# Patient Record
Sex: MALE | Race: WHITE | NOT HISPANIC OR LATINO | Employment: OTHER | URBAN - METROPOLITAN AREA
[De-identification: names, ages, dates, MRNs, and addresses within clinical notes are randomized per-mention and may not be internally consistent; named-entity substitution may affect disease eponyms.]

---

## 2018-07-16 ENCOUNTER — HOSPITAL ENCOUNTER (OUTPATIENT)
Dept: ULTRASOUND IMAGING | Facility: CLINIC | Age: 58
Discharge: HOME | End: 2018-07-16
Attending: FAMILY MEDICINE | Admitting: FAMILY MEDICINE
Payer: MEDICARE

## 2018-07-16 DIAGNOSIS — Z90.5 HX OF PARTIAL NEPHRECTOMY: ICD-10-CM

## 2018-07-16 PROCEDURE — 76770 US EXAM ABDO BACK WALL COMP: CPT

## 2018-10-15 ENCOUNTER — TRANSFERRED RECORDS (OUTPATIENT)
Dept: HEALTH INFORMATION MANAGEMENT | Facility: CLINIC | Age: 58
End: 2018-10-15

## 2018-10-18 ENCOUNTER — TRANSFERRED RECORDS (OUTPATIENT)
Dept: HEALTH INFORMATION MANAGEMENT | Facility: CLINIC | Age: 58
End: 2018-10-18

## 2018-10-30 ENCOUNTER — TRANSFERRED RECORDS (OUTPATIENT)
Dept: HEALTH INFORMATION MANAGEMENT | Facility: CLINIC | Age: 58
End: 2018-10-30

## 2018-10-30 ENCOUNTER — MEDICAL CORRESPONDENCE (OUTPATIENT)
Dept: HEALTH INFORMATION MANAGEMENT | Facility: CLINIC | Age: 58
End: 2018-10-30

## 2018-12-05 ENCOUNTER — PRE VISIT (OUTPATIENT)
Dept: SLEEP MEDICINE | Facility: CLINIC | Age: 58
End: 2018-12-05

## 2018-12-05 NOTE — TELEPHONE ENCOUNTER
"  1.  Reason for the visit:  Cosult to discuss low oxygen levels at night  2.  Referring provider and clinic name:  [facility]  3.  Previous Sleep Doctor or Pulmonlogist (clinic name)?  None  4.  Records, Procedures, Imaging, and Labs (see below)  No records to obtain        All NOTES from previous office visits that pertain to why they are being seen in the Sleep Center    Previous Sleep Studies, Chest CT, Echos and reports that pertain to why they are seeing Sleep Center    All Sleep records that have been done in the last 2 years that pertain to why they are seeing Sleep Center            Are they being seen for continuation of care for Cpap/Bipap/Avap/Trilogy/Dental Device? none    If yes to above Who and Where was Device issued/currently getting supplies from? na    Are you currently on \"Supplemental Oxygen\" during the day or night?   na                                                                                                                                                      Please remind pt to bring Cpap machine and ask to arrive 15 minutes early to appointment due traffic and congestion                                                 5. Pt Sleep Center Packet received Message left asking pt to arrive 30 minutes early to appointment if no packet received.        Yes: \"please make sure that you bring this to your appointment completed, either the doctor will not see you until this completed or you may be asked to reschedule your appointment.\"     No: mail or email to the pt and explain, \"please make sure that you bring this to your appointment completed, either the doctor will not see you until this completed or you may be asked to reschedule your appointment.\"     ~If pt coming early to fill packet out, ask that they come 30 minutes prior to their appointment~     6. Has the pt's medication list been updated and preferred pharmacy added?     7. Has the allergy list been reviewed?    \"Thank you for " "choosing [facility] and we look forward to seeing you at your upcoming appointment\"     "

## 2018-12-06 ENCOUNTER — OFFICE VISIT (OUTPATIENT)
Dept: SLEEP MEDICINE | Facility: CLINIC | Age: 58
End: 2018-12-06
Payer: MEDICARE

## 2018-12-06 VITALS
SYSTOLIC BLOOD PRESSURE: 106 MMHG | RESPIRATION RATE: 16 BRPM | BODY MASS INDEX: 21.47 KG/M2 | HEIGHT: 70 IN | DIASTOLIC BLOOD PRESSURE: 70 MMHG | HEART RATE: 81 BPM | WEIGHT: 150 LBS | OXYGEN SATURATION: 100 %

## 2018-12-06 DIAGNOSIS — R09.02 HYPOXEMIA: Primary | ICD-10-CM

## 2018-12-06 PROCEDURE — 99204 OFFICE O/P NEW MOD 45 MIN: CPT | Performed by: INTERNAL MEDICINE

## 2018-12-06 NOTE — NURSING NOTE
"    Chief Complaint   Patient presents with     Consult     Follow up MENDEL       Initial /70  Pulse 81  Resp 16  Ht 1.778 m (5' 10\")  Wt 68 kg (150 lb)  SpO2 100%  BMI 21.52 kg/m2 Estimated body mass index is 21.52 kg/(m^2) as calculated from the following:    Height as of this encounter: 1.778 m (5' 10\").    Weight as of this encounter: 68 kg (150 lb).    Medication Reconciliation: complete    Neck circumference: 16 inches / 40 centimeters.    DME:     Anni Castillo [facility] ~[place]       "
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
## 2018-12-06 NOTE — MR AVS SNAPSHOT
After Visit Summary   12/6/2018    Alex Rodríguez    MRN: [id number]           Patient Information     Date Of Birth          1960        Visit Information        Provider Department      12/6/2018 2:00 PM Marino Martinez MD [facility]        Today's Diagnoses     Hypoxemia    -  1      Care Instructions      Your BMI is Body mass index is 21.52 kg/(m^2).  Weight management is a personal decision.  If you are interested in exploring weight loss strategies, the following discussion covers the approaches that may be successful. Body mass index (BMI) is one way to tell whether you are at a healthy weight, overweight, or obese. It measures your weight in relation to your height.  A BMI of 18.5 to 24.9 is in the healthy range. A person with a BMI of 25 to 29.9 is considered overweight, and someone with a BMI of 30 or greater is considered obese. More than two-thirds of American adults are considered overweight or obese.  Being overweight or obese increases the risk for further weight gain. Excess weight may lead to heart disease and diabetes.  Creating and following plans for healthy eating and physical activity may help you improve your health.  Weight control is part of healthy lifestyle and includes exercise, emotional health, and healthy eating habits. Careful eating habits lifelong are the mainstay of weight control. Though there are significant health benefits from weight loss, long-term weight loss with diet alone may be very difficult to achieve- studies show long-term success with dietary management in less than 10% of people. Attaining a healthy weight may be especially difficult to achieve in those with severe obesity. In some cases, medications, devices and surgical management might be considered.  What can you do?  If you are overweight or obese and are interested in methods for weight loss, you should discuss this with your provider.     Consider reducing daily  calorie intake by 500 calories.     Keep a food journal.     Avoiding skipping meals, consider cutting portions instead.    Diet combined with exercise helps maintain muscle while optimizing fat loss. Strength training is particularly important for building and maintaining muscle mass. Exercise helps reduce stress, increase energy, and improves fitness. Increasing exercise without diet control, however, may not burn enough calories to loose weight.       Start walking three days a week 10-20 minutes at a time    Work towards walking thirty minutes five days a week     Eventually, increase the speed of your walking for 1-2 minutes at time    In addition, we recommend that you review healthy lifestyles and methods for weight loss available through the National Institutes of Health patient information sites:  http://win.niddk.nih.gov/publications/index.htm    And look into health and wellness programs that may be available through your health insurance provider, employer, local community center, or deric club.                  Follow-ups after your visit        Future tests that were ordered for you today     Open Future Orders        Priority Expected Expires Ordered    Overnight oximetry study Routine  6/4/2019 12/6/2018            Who to contact     If you have questions or need follow up information about today's clinic visit or your schedule please contact [facility] directly at [phone].  Normal or non-critical lab and imaging results will be communicated to you by MyChart, letter or phone within 4 business days after the clinic has received the results. If you do not hear from us within 7 days, please contact the clinic through MyChart or phone. If you have a critical or abnormal lab result, we will notify you by phone as soon as possible.  Submit refill requests through [employer] or call your pharmacy and they will forward the refill request to us. Please allow 3 business days for your  "refill to be completed.          Additional Information About Your Visit        Care EveryWhere ID     This is your Care EveryWhere ID. This could be used by other organizations to access your [place] medical records  [id number]        Your Vitals Were     Pulse Respirations Height Pulse Oximetry BMI (Body Mass Index)       81 16 1.778 m (5' 10\") 100% 21.52 kg/m2        Blood Pressure from Last 3 Encounters:   12/06/18 106/70   11/04/13 (!) 80/59   02/20/13 103/65    Weight from Last 3 Encounters:   12/06/18 68 kg (150 lb)   10/08/12 70.3 kg (155 lb)   09/20/12 70.3 kg (155 lb)               Primary Care Provider Office Phone # Fax #    Aileen Paulino [phone]       [place] [address]        Equal Access to Services     NESHA MCGEE : Hadii aad ku hadasho Soomaali, waaxda luqadaha, qaybta kaalmada adeegyada, waxay idiin hayaan rene kharaalexis jangn ah. So [facility] [phone].    ATENCIÓN: Si habla español, tiene a william disposición servicios gratuitos de asistencia lingüística. Lilliam al [phone].    We comply with applicable federal civil rights laws and [place] laws. We do not discriminate on the basis of race, color, national origin, age, disability, sex, sexual orientation, or gender identity.            Thank you!     Thank you for choosing [facility]  for your care. Our goal is always to provide you with excellent care. Hearing back from our patients is one way we can continue to improve our services. Please take a few minutes to complete the written survey that you may receive in the mail after your visit with us. Thank you!             Your Updated Medication List - Protect others around you: Learn how to safely use, store and throw away your medicines at www.disposemymeds.org.          This list is accurate as of 12/6/18  3:18 PM.  Always use your most recent med list.                   Brand Name Dispense Instructions " "for use Diagnosis    acidophilus 10 MG Caps    BACID     Take 1 capsule by mouth 2 times daily.        bisacodyl 5 MG EC tablet    DULCOLAX    2 tablet    Take 1 tablet (5 mg) by mouth See Admin Instructions Refer to \"Getting Ready for a Colonoscopy\" patient handout    Special screening for malignant neoplasms, colon       COMPRESSION STOCKINGS     3 each    1 each daily Compression stockings, knee high to be worn during days, ok off at night. Medium 20-30 mmHg compression. 3 pair. 6 refills.    Edema       fluticasone 50 MCG/ACT nasal spray    FLONASE     Spray 2 sprays into both nostrils daily.        MACRODANTIN PO      Take 100 mg in the morning and 200 mg in the evening.        magnesium citrate solution     296 mL    Take 296 mLs by mouth See Admin Instructions Refer to \"Getting Ready for a Colonoscopy\" patient handout.    Special screening for malignant neoplasms, colon       multivitamin w/minerals tablet      Take 1 tablet by mouth daily.        * polyethylene glycol packet    MIRALAX/GLYCOLAX     Take 1 packet by mouth 2 times daily.        * polyethylene glycol packet    MIRALAX    238 g    Take 238 g by mouth See Admin Instructions One 8.3-ounce bottle (238 g).  Refer to \"Getting Ready for a Colonoscopy\" patient handout    Special screening for malignant neoplasms, colon       psyllium 58.6 % packet    METAMUCIL     Take 1 packet by mouth 2 times daily.        ROBINUL 1 MG tablet   Generic drug:  glycopyrrolate      Take 1 mg by mouth 4 times daily        TERAZOSIN HCL PO      Take 5 mg by mouth daily.        * Notice:  This list has 2 medication(s) that are the same as other medications prescribed for you. Read the directions carefully, and ask your doctor or other care provider to review them with you.      "

## 2018-12-06 NOTE — PATIENT INSTRUCTIONS

## 2018-12-21 ENCOUNTER — TELEPHONE (OUTPATIENT)
Dept: SLEEP MEDICINE | Facility: CLINIC | Age: 58
End: 2018-12-21

## 2018-12-21 NOTE — TELEPHONE ENCOUNTER
Patient called and left a message at 2:05 PM on 12/21/2018 and asked for a call back. I returned the patient's message on 12/21/2018 at 2:25 PM and was told that for the past two weeks he has been calling and leaving messages for Dr. Martinez's nurse and can not believe that it is so hard to get a hold of someone at the clinic. He said that because it was so hard to get a nurse on the phone when you call the main number and push the botton for a nurse he decided to call the  and left the message today which was given to me. He sated that he has been trying to get a hold of  to discuss proceeding with the MENDEL's. Patient has found head gear he feels will work well and is wanting to know what he needs to do next to get the MENDEL. I explained to him that he could come and pick one up in the clinic and that because of his difficulties with walking which require the use of a wheelchair the patient has asked that someone else be able to come on his behalf and pick it up and return it. I told the patient that normally this is not done but because of the situation we should be able to accommodate him. Patient also asked for help with his my chart set up. This was also done. Patient said that he will call back once he has his transportation figured out. Patient advised to either my-chart or call and ask for Anni or [place] directly. Patient stated that he would do so from now on. Patient thanked me for all his help. Patient had no other questions at this time.    Anni Castillo [facility]

## 2019-01-25 ENCOUNTER — TELEPHONE (OUTPATIENT)
Dept: SLEEP MEDICINE | Facility: CLINIC | Age: 59
End: 2019-01-25

## 2019-01-25 DIAGNOSIS — G47.33 OSA (OBSTRUCTIVE SLEEP APNEA): Primary | ICD-10-CM

## 2019-01-25 NOTE — TELEPHONE ENCOUNTER
Patient calling requesting call back to [employer] messages he has left and states has not received a response.     Telephone call:    58-year-old tetraplegic with oximetry suggestive of obstructive sleep apnea [AHI 36 and 180 minutes of hypoxemia less than or equal to 88%] but no clinical symptoms of daytime respiratory failure.  He is very reluctant to have standard medical care for evaluation and management of possible sleep-related breathing disorder.  Specifically he does not want to have facility evaluation for his sleep apnea and is not interested in CPAP.  He has obtained an oral device which demonstrates mandibular protrusion and would like to have repeat oximetry done with his device.  There has been a delay since our initial evaluation as he was trying multiple chin straps and oral devices before settling on one he feels is currently controlling his snoring.    PLAN: 2 nights of oximetry with use of his online obtained oral device.

## 2019-02-01 NOTE — TELEPHONE ENCOUNTER
Called patient to schedule 2 night MENDEL. Patient states at this time he is unable to schedule. He will call early next week to schedule a  day.    Chad Arriaga  Sleep Clinic Specialist - [place]

## 2019-02-04 ENCOUNTER — OFFICE VISIT (OUTPATIENT)
Dept: SLEEP MEDICINE | Facility: CLINIC | Age: 59
End: 2019-02-04
Payer: MEDICARE

## 2019-02-04 DIAGNOSIS — G47.33 OSA (OBSTRUCTIVE SLEEP APNEA): Primary | ICD-10-CM

## 2019-02-04 NOTE — PROGRESS NOTES
Patient instructed on MENDEL use. Patient demonstrated and verbalized knowledge of use. Insurance was verified by financial securing. Device programmed. Device will be returned 2/6/2019 before noon.    Chad Arriaga  Sleep Clinic Specialist - [place]

## 2019-02-06 ENCOUNTER — DOCUMENTATION ONLY (OUTPATIENT)
Dept: SLEEP MEDICINE | Facility: CLINIC | Age: 59
End: 2019-02-06
Payer: MEDICARE

## 2019-02-06 NOTE — Clinical Note
MENDEL has been returned and downloaded. Report has been put in provider folder for review.Chad Byrne [facility] Specialist - [place]

## 2019-02-06 NOTE — NURSING NOTE
Overnight oximetry returned to clinic today 2/6/2019. The encounter was routed to the provider for review. A copy of these results have also been scanned.    Day 1  Recording date: 2/4/2019    Duration: 07:58:08    Day 2  Recording date: 2/5/2019    Duration: 08:01:20      Chad Arriaga  Sleep Clinic Specialist - [place]

## 2019-02-11 NOTE — PROCEDURES
PHYSICIAN INTERPRETATION   HOME OXIMETRY   Patient: Alex Rodríguez  MRN: [id number]  YOB: 1960  Study Date: 2/4/2019  Referring Physician: Aileen Stack  Ordering Physician: JUAN ARIAS MD     Conditions: Self made oral device for probable obstructive sleep apnea  Quality: artifact-free     Measure [threshold]                 Time less than or equal to SpO2 88% [5 min]:  77min  Duration monitoring [> 2 hours artifact free]:  8 hours                    4% O2 desat index [ > 15/ hour]:    54/ hour                    Pattern:       oscillatory                                  Assessment:   Hypoxemia present  Episodic pattern suggestive of severe obstructive sleep apnea by the use of a self-made oral device    Recommendations: Diagnostic testing and therapy for obstructive sleep apnea and hypoventilation in the setting of respiratory insufficiency secondary to quadriparesis.      Diagnosis Code(s):  Hypoxemia G47.36, Sleep apnea G47.33   JUAN ARIAS MD, February 11, 2019

## 2019-03-14 ENCOUNTER — TELEPHONE (OUTPATIENT)
Dept: SLEEP MEDICINE | Facility: CLINIC | Age: 59
End: 2019-03-14

## 2019-03-14 DIAGNOSIS — G47.33 OSA (OBSTRUCTIVE SLEEP APNEA): Primary | ICD-10-CM

## 2019-03-14 NOTE — TELEPHONE ENCOUNTER
Telephone call    58-year-old partial quadriplegic with oximetry adjusting severe obstructive sleep apnea with hypoxemia.  This gentleman is not willing to undergo sleep testing for confirmation and has developed his own mandibular advancement device for treatment that extends the lower jaw incisors approximately 1 cm below his upper incisors.  He is able to tolerate this for one night and would like to determine whether oximetry is corrected with the use of this device.  He has undergone a previous oximetry using an [employer] mouthguard that does not produce mandibular advancement-this oximetry on February 4, 2019 demonstrated the findings suggesting of obstructive sleep apnea.  Again this patient is unwilling to undergo polysomnography for more accurate testing for sleep related breathing disorder in the setting of his quadriplegia and probable severe obstructive sleep apnea and understands the compromise in effectiveness and diagnostic workup under the circumstances.

## 2019-03-15 ENCOUNTER — TELEPHONE (OUTPATIENT)
Dept: SLEEP MEDICINE | Facility: CLINIC | Age: 59
End: 2019-03-15

## 2019-03-15 NOTE — TELEPHONE ENCOUNTER
Spoke with the patient on 3/15/2019 at 10:37 AM and was told that he is not claire to schedule and he will call back at the first part of next week.    Anni Castillo [facility] ~[place]

## 2019-03-21 ENCOUNTER — OFFICE VISIT (OUTPATIENT)
Dept: SLEEP MEDICINE | Facility: CLINIC | Age: 59
End: 2019-03-21
Payer: MEDICARE

## 2019-03-21 DIAGNOSIS — G47.33 OSA (OBSTRUCTIVE SLEEP APNEA): ICD-10-CM

## 2019-03-21 NOTE — NURSING NOTE
Patient instructed on MENDEL use. Patient demonstrated and verbalized knowledge of use. Insurance was verified by financial securing. Device programmed. Device will be returned tomorrow before noon.    Chad Arriaga  Sleep Clinic Specialist - [place]

## 2019-03-21 NOTE — PROGRESS NOTES
Patient instructed on MENDEL use. Patient demonstrated and verbalized knowledge of use. Insurance was verified by financial securing. Device programmed. Device will be returned tomorrow before noon.    Chad Arriaga  Sleep Clinic Specialist - [place]

## 2019-03-22 ENCOUNTER — DOCUMENTATION ONLY (OUTPATIENT)
Dept: SLEEP MEDICINE | Facility: CLINIC | Age: 59
End: 2019-03-22
Payer: MEDICARE

## 2019-03-22 DIAGNOSIS — R06.89 HYPOVENTILATION: Primary | ICD-10-CM

## 2019-03-22 NOTE — Clinical Note
Pulmonary function test request quadriplegic wheelchair patientdiagnosis neuromuscular disease:Please perform vital capacity, flow volume loop, maximum inspiratory mouth pressure, maximum expiratory mouth pressure

## 2019-03-22 NOTE — PROGRESS NOTES
Overnight oximetry returned to clinic today 3/22/2019. The encounter was routed to the provider for review. A copy of these results have also been scanned.    Recording date: 3/21/2019    Duration: 07:55:32    Chad Arriaga  Sleep Clinic Specialist - [place]

## 2019-03-25 NOTE — PROCEDURES
PHYSICIAN INTERPRETATION   HOME OXIMETRY   Patient: Alex Rodríguez  MRN: [id number]  YOB: 1960  Study Date: March 21, 2019  Referring Physician: Aileen Stack  Ordering Physician: JUAN ARIAS MD     Conditions: Self structured mandibular advancement device adjusted to move mandible forward 1 cm strap mouth closed.  Quality: artifact free     Measure [threshold]                 Time less than or equal to SpO2 88% [5 min]:  68 min  Duration monitoring [> 2 hours artifact free]:  8 hours                    4% O2 desat index [ > 15/ hour]:    48/ hour                    Pattern:       oscillatory                                  Assessment:   Hypoxemia present  Episodic pattern suggestive of sleep apnea  This patient currently refuses polysomnography for confirmation of his sleep apnea/neuromuscular respiratory failure and optimization of noninvasive ventilatory support settings.    Recommendations:  This patient has quadriparesis and is wheelchair-bound with evidence of neuromuscular disease and respiratory failure and oximetry most compatible with struct of sleep apnea.  On the basis of restrictive lung disease, he would qualify for the use of a average volume assured pressure support noninvasive ventilatory strategy using either nasal mask or facemask.  We have ordered pulmonary function tests and venous blood gas to determine his qualification for empiric use of noninvasive ventilation for neuromuscular disease.  We will use my chart communication after this testing is performed to discuss noninvasive ventilatory support.    Diagnosis Code(s):  Hypoxemia G47.36, Sleep apnea G47.33   JUAN ARIAS MD, March 25, 2019

## 2019-05-30 DIAGNOSIS — R06.02 SOB (SHORTNESS OF BREATH): Primary | ICD-10-CM

## 2019-06-04 LAB
EXPTIME-PRE: 1.99 SEC
FEF2575-%PRED-PRE: 49 %
FEF2575-PRE: 1.56 L/SEC
FEF2575-PRED: 3.17 L/SEC
FEFMAX-%PRED-PRE: 21 %
FEFMAX-PRE: 2.06 L/SEC
FEFMAX-PRED: 9.43 L/SEC
FEV1-%PRED-PRE: 16 %
FEV1-PRE: 0.62 L
FEV1FEV6-PRE: 97 %
FEV1FEV6-PRED: 79 %
FEV1FVC-PRE: 97 %
FEV1FVC-PRED: 78 %
FIFMAX-PRE: 1.28 L/SEC
FVC-%PRED-PRE: 13 %
FVC-PRE: 0.63 L
FVC-PRED: 4.76 L
MEP-PRE: 7 CMH2O
MIP-PRE: -6 CMH2O

## 2019-06-11 ENCOUNTER — VIRTUAL VISIT (OUTPATIENT)
Dept: SLEEP MEDICINE | Facility: CLINIC | Age: 59
End: 2019-06-11
Payer: MEDICARE

## 2019-06-11 DIAGNOSIS — G82.50 TETRAPLEGIA (H): Primary | ICD-10-CM

## 2019-06-11 PROCEDURE — 99443 ZZC PHYSICIAN TELEPHONE EVALUATION 21-30 MIN: CPT | Performed by: INTERNAL MEDICINE

## 2019-06-11 NOTE — PROGRESS NOTES
Telephone visit-    58-year-old male with tetraplegia and probable severe obstructive sleep apnea with suspected respiratory muscle weakness and chronic respiratory failure.  He does experience rather severe dyspnea when lying on his back suggesting he does have diaphragm weakness. Confirmation of sufficient diaphragmatic weakness would qualify him for the use of a respiratory assist device without the need for polysomnography.  He does not have sufficient intensity of chronic respiratory failure in terms of hospitalizations and emergency room visits to justify noninvasive ventilatory support with a more advanced device.     We did obtain pulmonary function tests however he was left with the impression that he should try to create voluntarily an effort similar to what his maximal effort was supine.  This resulted in marked variations and uninterpretable measures.  This was a rather peculiar approach and we instructed him that he must make maximal efforts for vital capacity,  maximum inspiratory pressure and maximum expiratory pressure in the supine and sitting position settings to rightfully qualify him for an appropriate therapy.  If he does need respiratory assist device we could offer an auto titration device to treat any underlying obstructive sleep apnea.  In the event that he does not qualify, he will have to undergo polysomnography for management of his sleep apnea.     Supine and sitting vital capacity and maximal inspiratory next pre-mouth pressures reordered.    Total time 25 min

## 2019-07-24 ENCOUNTER — ANCILLARY PROCEDURE (OUTPATIENT)
Dept: ULTRASOUND IMAGING | Facility: CLINIC | Age: 59
End: 2019-07-24
Attending: FAMILY MEDICINE
Payer: MEDICARE

## 2019-07-24 DIAGNOSIS — G82.50 TETRAPLEGIA (H): ICD-10-CM

## 2019-07-24 DIAGNOSIS — Z90.5 HISTORY OF PARTIAL NEPHRECTOMY: ICD-10-CM

## 2019-07-24 LAB
EXPTIME-PRE: 4.32 SEC
FEF2575-%PRED-POST: 99 %
FEF2575-%PRED-PRE: 81 %
FEF2575-POST: 3.16 L/SEC
FEF2575-PRE: 2.58 L/SEC
FEF2575-PRED: 3.17 L/SEC
FEFMAX-%PRED-PRE: 41 %
FEFMAX-PRE: 3.95 L/SEC
FEFMAX-PRED: 9.43 L/SEC
FEV1-%PRED-PRE: 52 %
FEV1-PRE: 1.95 L
FEV1FEV6-PRE: 95 %
FEV1FEV6-PRED: 79 %
FEV1FVC-PRE: 95 %
FEV1FVC-PRED: 78 %
FIFMAX-PRE: 1.85 L/SEC
FVC-%PRED-PRE: 43 %
FVC-PRE: 2.05 L
FVC-PRED: 4.76 L
MEP-PRE: 21 CMH2O
MIP-PRE: -46 CMH2O

## 2019-07-25 DIAGNOSIS — G70.9 NEUROMUSCULAR DISEASE (H): Primary | ICD-10-CM

## 2019-07-25 NOTE — PROGRESS NOTES
Telephone call    58-year-old male who has refused sleep studies for management of assumed chronic respiratory failure and obstructive sleep apnea.  He meets criteria for the use of average volume assured ventilatory support with a respiratory assist device based on vital capacity of 43% predicted and maximum territory mouth pressure of 46% of predicted based on testing from July 24, 2019.  Oximetry is highly suggestive of severe obstructive sleep apnea and possible hypoventilation.  Again he refuses inhouse titration of a respiratory assist device.      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

## 2019-07-30 ENCOUNTER — TELEPHONE (OUTPATIENT)
Dept: SLEEP MEDICINE | Facility: CLINIC | Age: 59
End: 2019-07-30

## 2019-07-30 NOTE — TELEPHONE ENCOUNTER
Called patient to confirm 4PM setup tomorrow that Elaine RIVERA scheduled. Confirmed PCA will be present at the appointment as well.

## 2019-07-31 ENCOUNTER — DOCUMENTATION ONLY (OUTPATIENT)
Dept: SLEEP MEDICINE | Facility: CLINIC | Age: 59
End: 2019-07-31
Payer: MEDICARE

## 2019-07-31 DIAGNOSIS — G70.9 NEUROMUSCULAR DISEASE (H): Primary | ICD-10-CM

## 2019-07-31 NOTE — PROGRESS NOTES
Patient was offered choice of vendor and chose [facility].  Patient Alex Rodríguez was set up at Patient Home on July 31, 2019. Patient received a [employer] DreamStation AVAPS. Pressures were set at EPAP 8, IPAP MIN 14 IPAP MAX 20, RR 8, .   Patient s ramp is 6 cm H2O for 20 min.  Patient received a Resmed Mask name: Airfit N30I  Nasal mask size Medium, heated tubing and heated humidifier.  Patient is enrolled in the STM Program and does need to meet compliance. Patient has a follow up on TBD with Dr. Martinez.    Noni French     Upon arrival, patient was persistent in using a full face mask as he discussed with Elaine via phone. Confirmed patient's PCA lives with patient and is there almost 24/7 to assist patient. During setup, Dr. Martinez was called. Dr. Martinez ok to decrease back up rate from 12 to 8 breaths per minute as patient felt like he was being forced to take a breath.

## 2019-08-04 ENCOUNTER — MYC MEDICAL ADVICE (OUTPATIENT)
Dept: SLEEP MEDICINE | Facility: CLINIC | Age: 59
End: 2019-08-04

## 2019-08-04 DIAGNOSIS — G47.33 OSA (OBSTRUCTIVE SLEEP APNEA): Primary | ICD-10-CM

## 2019-08-05 ENCOUNTER — DOCUMENTATION ONLY (OUTPATIENT)
Dept: SLEEP MEDICINE | Facility: CLINIC | Age: 59
End: 2019-08-05

## 2019-08-05 ENCOUNTER — DOCUMENTATION ONLY (OUTPATIENT)
Dept: SLEEP MEDICINE | Facility: CLINIC | Age: 59
End: 2019-08-05
Payer: MEDICARE

## 2019-08-05 DIAGNOSIS — M75.40 IMPINGEMENT SYNDROME, SHOULDER: ICD-10-CM

## 2019-08-05 DIAGNOSIS — G70.9 NEUROMUSCULAR DISEASE (H): Primary | ICD-10-CM

## 2019-08-05 DIAGNOSIS — M25.519 SHOULDER PAIN: ICD-10-CM

## 2019-08-05 NOTE — PROGRESS NOTES
Completed pressure change via encore. I have emailed patient to let him know that pressure changes have been completed remotely and will go through when modem calls in again tomorrow morning.

## 2019-08-05 NOTE — PROGRESS NOTES
Patient called and stated he is having trouble opening my emails. Patient asked me to send him a [employer] message. Patient then sent a message asking for my email. Patient having difficulty understanding my job title.

## 2019-08-05 NOTE — PROGRESS NOTES
3 DAY STM VISIT    Diagnostic AHI:      Patient contacted for 3 day STM visit.  Subjective measures:  Patient emailed Dr Martinez because he feels his pressure is to high. Patient request I send him an email.       Device type: AVAPS/IVAPS  PAP settings from order::  [employer] DreamStation AVAPS. Pressures were set at EPAP 8, IPAP MIN 14 IPAP MAX 20, RR 8, .   Mask type: Nasal Mask     Device settings from machine.          Assessment: Nightly usage over four hours. Patient AHI elevated.   Action plan: Pt to have f/u 14 day STM visit.  Patient has a follow up visit scheduled:   no.    Total time spent on remote patient monitoring data analysis and patient contact today:   15 minutes

## 2019-08-06 ENCOUNTER — DOCUMENTATION ONLY (OUTPATIENT)
Dept: SLEEP MEDICINE | Facility: CLINIC | Age: 59
End: 2019-08-06

## 2019-08-06 NOTE — PROGRESS NOTES
I have been in contact with patient via email regarding concerns with his mask and discomfort. He's currently using the Resmed N30I nasal mask. He notes his nose is getting sore and the sealing worked great when I was there, but is degrading. Confirmed he wanted to try other nasal masks. He sent me a collage of photos of a mask that his sister uses. I reviewed and it was 4 different styles of masks; RM P10, RP Dreamwear FFM, RP Dreamwear nasal, and F&P Brevida pillows. I explained that pillows may be uncomfortable as pressure is going directly inside the nose, it will cause soreness. I discussed the Resmed N20, F&P nasal wisp, and F&P Eson 2. Patient stated he would like to wait and try his new pressures for a little while longer. Patient has not been using humidity/tube temperature. Patient is asking if using it will make his sinuses feel better. I confirmed that it may help and make his AVAPS more comfortable to use. I will turn on his humidity and tube temperature, but changes will not go through until tomorrow morning when modem calls again. But even so, if he adds distilled water into the water chamber tonight, the air will go over the water and still provide patient with some moisture. Instructed patient to let me know if there is anything further I can help him with.

## 2019-08-13 ENCOUNTER — MYC MEDICAL ADVICE (OUTPATIENT)
Dept: SLEEP MEDICINE | Facility: CLINIC | Age: 59
End: 2019-08-13

## 2019-08-13 ENCOUNTER — DOCUMENTATION ONLY (OUTPATIENT)
Dept: SLEEP MEDICINE | Facility: CLINIC | Age: 59
End: 2019-08-13

## 2019-08-13 NOTE — PROGRESS NOTES
Patient requested a summary report from his first week of usage and wanted to know if this could be done automatically via email. Patient also wanted to know when people get adjusted to PAP therapy, as he's still having difficulty. Returned patient's email with summary report and informed him that there is not an automatic option, and to continue to request them as he sees fit. Discussed that every person is different so adjustment periods will be different; could vary from 2 weeks to several months as it just depends on the patient. I have followed up with asking how he's feeling and when the discomfort is happening to determine if any comfort settings need to be adjusted.

## 2019-08-15 ENCOUNTER — DOCUMENTATION ONLY (OUTPATIENT)
Dept: SLEEP MEDICINE | Facility: CLINIC | Age: 59
End: 2019-08-15

## 2019-08-15 NOTE — PROGRESS NOTES
14 DAY STM VISIT           Data only recheck -patient has been in recent contact with [facility] regarding objective and subjective measures along with requesting mask options and download data.  No call placed to patient.     Assessment: Pt not meeting objective benchmarks for AHI      Action plan: pt to have 30 day [facility] visit.    Device type: AVAPS/IVAPS    PAP settings:    EPAP Fixed 8    IPAP Min 14    IPAP Max 18    Rate 8    Vt 450        Mask type:  Nasal Mask    Objective measures: 14 day rolling measures         Compliance  100 %     % of night spent in large leak  0 % last  upload      AHI 8.96   last  upload      Average number of minutes 517          Objective measure goal  Compliance   Goal >70%  Leak   Goal < 10%  AHI  Goal < 5  Usage  Goal >240      Total time spent on remote patient monitoring data analysis   :   10 minutes

## 2019-08-22 ENCOUNTER — TELEPHONE (OUTPATIENT)
Dept: SLEEP MEDICINE | Facility: CLINIC | Age: 59
End: 2019-08-22

## 2019-08-22 NOTE — TELEPHONE ENCOUNTER
Patient asked that I call him via email. Spoke to patient and he wanted to know if someone adjusted his pressures the last few days. Reviewed his encore account and no pressure changes noted. Patient stated he does not want Dr. Martinez nor myself nor anyone to change his pressures without making him aware of it first. Patient states he's so bloated that he can barely sit in his wheelchair. Informed patient that I have not yet heard from Dr. Martinez, but I will reach out to the virtual care coordinators to see if they can address this with Dr. Martinez and get back to patient. Patient states he cant say it's the CPAP causing the bloating, but he's feeling this way. Some nights on PAP are better than others. Patient would like me to place 1 month order for nasal cushions.

## 2019-08-27 ENCOUNTER — TELEPHONE (OUTPATIENT)
Dept: SLEEP MEDICINE | Facility: CLINIC | Age: 59
End: 2019-08-27

## 2019-08-27 NOTE — TELEPHONE ENCOUNTER
Telephone [age]av[id number]-old gentleman with severe obstructive sleep apnea based on home oximetry study was unwilling to have laboratory study with CPAP titration.  He additionally has severe respiratory muscle weakness secondary to quadriparesis and on the basis of his neuromuscular disease was started on average volume assured ventilatory support.  He is experiencing abdominal distention and a sense of overpressurization at the current settings and was reduced to an IPAP minimum of 10 maximum at 12 with an EPAP of 8 and a back-up rate of 8 with a tidal volume reduced from 450to 350. Patient will report back over the next few days after this change to see whether it is comfortable.

## 2019-09-03 ENCOUNTER — DOCUMENTATION ONLY (OUTPATIENT)
Dept: SLEEP MEDICINE | Facility: CLINIC | Age: 59
End: 2019-09-03

## 2019-09-03 NOTE — Clinical Note
Hi can you please review patients last PEERt message and send recommendations and I will change pressures /settings if you want.  Please let me know if you need downloads.Mei

## 2019-09-03 NOTE — PROGRESS NOTES
30 DAY [facility] VISIT       Per patient mychart message he continues to struggle with pressure settings.  Routing back to provider.          Assessment: Pt not meeting objective benchmarks for AHI and compliance     Action plan:    routing to provider for recommendations   Device type: AVAPS/IVAPS  PAP settings:       Mask type:  Nasal Mask    Objective measures: 14 day rolling measures         Compliance  64 %     % of night spent in large leak  0 % last  upload      AHI 8.99   last  upload      Average number of minutes 348          Objective measure goal  Compliance   Goal >70%  Leak   Goal < 10%  AHI  Goal < 5  Usage  Goal >240      Total time spent on remote patient monitoring data analysis and patient contact today:   10 minutes

## 2019-09-09 ENCOUNTER — TELEPHONE (OUTPATIENT)
Dept: SLEEP MEDICINE | Facility: CLINIC | Age: 59
End: 2019-09-09

## 2019-09-09 NOTE — TELEPHONE ENCOUNTER
Patient called me stating that he has not been using his AVAPS due to bloating. He reached back out to Dr. Martinez this afternoon regarding how he's feeling. Patient decided to call me as it's easier to get a hold of me. Patient informed me that it took him 9-10 days before he felt back to normal after not using AVAPS. He felt like his intestines and gut were all stretched out, and now finally feels normal. Patient stated he spoke to another provider and it was mentioned that respiratory therapists typically handle making changes. I informed patient that it's slightly different outpatient vs inpatient. In the hospital, we have a little bit more leeway as we have access to seeing the provider to make recommendations. Whereas outpatient, I can only do so much to adjust comfort settings only, but his prescribed pressures are handled by Dr. Martinez. Patient expressed understanding. I asked if patient could sleep slightly elevated, such as at a 45 degree angle, and patient stated he could potentially get a pressure sore on his bottom, and then be hospitalized. Patient stated if Dr. Martinez is going to make changes to his pressures again, then he may resist, and ask for AVAPS unit to be picked up. Apologized to patient. Informed patient that I would reach out to Dr. Martinez and TODD to review. Will update patient when I receive a response from either Dr. Martinez and/or TODD.

## 2019-09-10 ENCOUNTER — TELEPHONE (OUTPATIENT)
Dept: SLEEP MEDICINE | Facility: CLINIC | Age: 59
End: 2019-09-10

## 2019-09-10 DIAGNOSIS — G70.9 NEUROMUSCULAR DISEASE (H): Primary | ICD-10-CM

## 2019-09-10 NOTE — TELEPHONE ENCOUNTER
Received email from patient to call him. Spoke to patient. He's concerned about getting started back up with AVAPS, as he does not want to feel sick. He called Dr. Martinez and left a voicemail. We discussed his pressure changes and patient would like an email on the new pressures. Patient asked if Dr. Martinez would go any lower than what he changed it to. Informed patient that I'm unsure as it would be Dr. Martinez's call. Patient then had questions regarding getting an MENDEL in the future, if his AVAPS had that feature. Informed patient that it does not, and would have to use the wrist pulse oximeter that he used previously to complete MENDEL. Patient was upset by this as the AVAPS device is so expensive and why no one would  it to have a switch or cable to monitor oxygen levels. I informed patient that if he uses AVAPS, not to use tonight. Plug in device, and when mode calls again tomorrow, changes should go through. Patient asked if machine will turn off by itself if he pulls the mask off. Informed him that it would not as the device is an advanced machine and needs to be confirmed to stop ventilation. Patient was upset by this, as well, since it's an expensive machine. Informed patient that if he uses it for a few hours, and feels bloated, to stop therapy and can follow back up with myself or Dr. Martinez. Patient agreed. He's waiting for Dr. Martinez to return his call to address his concerns. Will route message to Dr. Martinez.

## 2019-09-16 ENCOUNTER — TELEPHONE (OUTPATIENT)
Dept: SLEEP MEDICINE | Facility: CLINIC | Age: 59
End: 2019-09-16

## 2019-09-16 ENCOUNTER — TRANSFERRED RECORDS (OUTPATIENT)
Dept: HEALTH INFORMATION MANAGEMENT | Facility: CLINIC | Age: 59
End: 2019-09-16

## 2019-09-16 ENCOUNTER — DOCUMENTATION ONLY (OUTPATIENT)
Dept: SLEEP MEDICINE | Facility: CLINIC | Age: 59
End: 2019-09-16

## 2019-09-16 LAB
ALT SERPL-CCNC: 21 U/L (ref 16–63)
AST SERPL-CCNC: 17 U/L (ref 0–55)
CHOLEST SERPL-MCNC: 182 MG/DL
CREAT SERPL-MCNC: 0.4 MG/DL (ref 0.7–1.3)
GFR SERPL CREATININE-BSD FRML MDRD: >60 ML/MIN/1.73M2
GLUCOSE SERPL-MCNC: 90 MG/DL (ref 70–124)
HDLC SERPL-MCNC: 43 MG/DL (ref 35–999)
LDLC SERPL CALC-MCNC: 126 MG/DL (ref 0–130)
POTASSIUM SERPL-SCNC: 4.2 MMOL/L (ref 3.4–5.3)
TRIGL SERPL-MCNC: 65 MG/DL (ref 20–150)

## 2019-09-16 NOTE — TELEPHONE ENCOUNTER
Patient called me to follow up with his excel spreadsheet. I made a typo in regards to his EPAP changes, and he wanted to confirm. Apologized for the typo. Patient states he felt great these first two nights with the new pressures. He states the 250 tidal volume feels right, and he may be able to go up in pressures (EPAP, IPAP). Reviewed download with patient and discussed AHI. Based on a detailed report, patient's AHI was 12.2 on 9/14/19 and 10.3 on 9/15/19. Informed patient that I could send a message to Dr. Martinez to review data. But, to proceed with trying new pressures for a week or two to make sure he's comfortable before making any further changes unless decided by Dr. Martinez. Patient expressed understanding. No further questions/concerns at this time.

## 2019-09-16 NOTE — PROGRESS NOTES
PT EMAILED ME STATING THAT HE WOULD BE TRYING BIPAP ON 9/14/19 SINCE DR. ARIAS REDUCED EPAP PRESSURE TO 6. HE SENT ME AN EXCEL SPREADSHEET FOR DATE OF CHANGE IN PRESSURES AND PRESSURE SETTINGS. PT NOTED THAT HE WANTED HEATED TUBING DURING THE WINTER. INFORMED PT THAT HIS TUBING HE HAS CURRENTLY IS HEATED AND HAS BEEN ON ALONG WITH HIS HUMIDITY LEVEL SINCE THE FIRST OR SECOND WEEK THAT HE STARTED USING AVAPS AS HE HAD DISCOMFORT (AN EMAIL WAS SENT REGARDING THIS).

## 2019-10-02 ENCOUNTER — TELEPHONE (OUTPATIENT)
Dept: SLEEP MEDICINE | Facility: CLINIC | Age: 59
End: 2019-10-02

## 2019-10-02 ENCOUNTER — HEALTH MAINTENANCE LETTER (OUTPATIENT)
Age: 59
End: 2019-10-02

## 2019-10-02 NOTE — TELEPHONE ENCOUNTER
"Called patient per his request. Patient states he's doing well with therapy. Average AHI is 8, currently. Patient mentioned that with his previous bloating, it could've been due to him taking Metamucil as it ferments. He has switched over to another fiber source, and hasn't had any issues thus far. Patient had questions about not seeing messages pop up on the screen anymore noting \"Congratulations you had a good night.\" Will clarify with [employer] as to what triggers that message to pop up and let patient know. Patient had no further questions/concerns.  "

## 2019-10-02 NOTE — TELEPHONE ENCOUNTER
Emailed patient with findings from [email or web address].  informed me that once you meet the minimum 70% usage showing compliance (4 hours for at least 21 out of 30 consecutive days) on your device, the message no longer pops up.

## 2019-10-25 ENCOUNTER — DOCUMENTATION ONLY (OUTPATIENT)
Dept: SLEEP MEDICINE | Facility: CLINIC | Age: 59
End: 2019-10-25

## 2019-10-25 NOTE — Clinical Note
Hi Dr. Martinez,Patient still continues to have stomach troubles, but is compliant with use of AVAPS. Could you please write a progress note for Medicare compliance regarding patient's therapy and usage? The advanced machines do not require face to face office visits per Medicare.Thank you,Noni

## 2019-10-25 NOTE — PROGRESS NOTES
Patient emailed me yesterday afternoon requesting to order 2 nasal cushion replacements. He noted that he is still having a lot of stomach troubles and is not ready to test higher pressures yet, and will keep me posted and to update Dr. Martinez.

## 2019-11-15 ENCOUNTER — TELEPHONE (OUTPATIENT)
Dept: SLEEP MEDICINE | Facility: CLINIC | Age: 59
End: 2019-11-15

## 2019-11-15 NOTE — TELEPHONE ENCOUNTER
Patient emailed me asking me to call him. Called patient. He states he's having stomach pain, however, he is not gassy like he was before. Patient states that when he is awake and opens his mouth, air doesn't rush out of his mouth like it's supposed to until he snorts. Confirmed he is not holding his breath causing any resistance. Suggested he follow up with ENT, and patient states he had a physical recently and provider stated his nasal passages looked fine. Asked if patient feels dry mouth in the morning, patient states no. He doesn't think he's opening his mouth at all. He's sleeping through the night which he didn't prior to AVAPS. Informed patient that I suggest he reach out to Dr. Martinez as I am not sure what could be causing his stomach pain. Patient kept asking if he should stop using AVAPS. Informed patient that is his choice and if he is having pain in his stomach. Again recommended to patient to follow up with Dr. Martinez. Patient agreed.

## 2019-11-22 ENCOUNTER — TELEPHONE (OUTPATIENT)
Dept: SLEEP MEDICINE | Facility: CLINIC | Age: 59
End: 2019-11-22

## 2019-11-22 NOTE — TELEPHONE ENCOUNTER
Thank you so much, Dr. Martinez. Are you able to write a progress note regarding patient's usage and therapy for Medicare? Per the LCD, Medicare does not require patients to have a face to face follow up for RADs, however, require documentation from provider.

## 2019-11-22 NOTE — TELEPHONE ENCOUNTER
Telephone call-    This patient with quadriparesis has minimal abdominal sensation but feels he is having pain or burning deep in his abdomen and experiences discomfort throughout the day.  He is currently being treated for possible reflux or dyspepsia and has been managed for his constipation and distention in the past.  During adolescence he did have a negative endoscopy for somewhat similar symptoms.  There has been a recommendation for repeat endoscopy.      His sense of distention did worse with the use of mask noninvasive ventilatory support but is not clear that this is his current symptoms.  I have instructed him to take 2 days away from the AVAPS to determine whether there is a substantial improvement in his symptoms as this may help us in determining the cause.  He will send a [email or web address] message early next week.

## 2019-11-25 NOTE — TELEPHONE ENCOUNTER
Telephone call   Sleep medicine November 22, 2019   59-year-old gentleman with quadriparesis and restrictive lung disease and chronic restrictive respiratory failure based on neuromuscular disease with complicating obstructive sleep apnea currently being managed with average volume assured ventilatory support by nasal mask.  Although he has had problems with abdominal distention intermittently he has been using the device and benefiting from management of these conditions.

## 2019-12-02 ENCOUNTER — TRANSFERRED RECORDS (OUTPATIENT)
Dept: HEALTH INFORMATION MANAGEMENT | Facility: CLINIC | Age: 59
End: 2019-12-02

## 2019-12-16 ENCOUNTER — HEALTH MAINTENANCE LETTER (OUTPATIENT)
Age: 59
End: 2019-12-16

## 2020-01-14 ENCOUNTER — DOCUMENTATION ONLY (OUTPATIENT)
Dept: SLEEP MEDICINE | Facility: CLINIC | Age: 60
End: 2020-01-14

## 2020-01-27 ENCOUNTER — TELEPHONE (OUTPATIENT)
Dept: UROLOGY | Facility: CLINIC | Age: 60
End: 2020-01-27

## 2020-01-27 NOTE — TELEPHONE ENCOUNTER
We cannot help this patient he needs an appt  I tried calling him several times and the number does not go thru  Jesenia Colby LPN Staff Nurse       [facility]    Phone Message    May a detailed message be left on voicemail: no    Reason for Call: Other: Pt says his current colonized bladder infection has increased and needs an antibiotic for the infection. Pt says he was last seen in 2012. Please call Pt back.     Action Taken: Message routed to:  [facility]  CLINICAL [place]

## 2020-01-28 ENCOUNTER — DOCUMENTATION ONLY (OUTPATIENT)
Dept: SLEEP MEDICINE | Facility: CLINIC | Age: 60
End: 2020-01-28
Payer: MEDICARE

## 2020-01-28 NOTE — PROGRESS NOTES
6 Month [facility] [place] visit    Diagnostic AHI: PSG    Data only recheck     Assessment: Pt meeting objective benchmarks. Patient compliance is 85.6% the last 6 months.     Action plan:   pt to follow up per provider request    Device type: AVAPS/IVAPS  PAP settings:  90th % pressure 6  cm  H20    Objective measures: 14 day rolling measures         Compliance  100 %      AHI 8.05   last  upload      Average number of minutes 507           Objective measure goal  Compliance   Goal >70%  Leak   Goal < 10%  AHI  Goal < 5  Usage  Goal >240    Total time spent on accessing, reviewing and interpreting remote patient PAP therapy data:   10 minutes    Total time spent with direct patient communication :   0 minutes

## 2020-02-04 ENCOUNTER — TRANSFERRED RECORDS (OUTPATIENT)
Dept: HEALTH INFORMATION MANAGEMENT | Facility: CLINIC | Age: 60
End: 2020-02-04

## 2020-02-06 ENCOUNTER — TRANSFERRED RECORDS (OUTPATIENT)
Dept: HEALTH INFORMATION MANAGEMENT | Facility: CLINIC | Age: 60
End: 2020-02-06

## 2020-02-06 ENCOUNTER — MEDICAL CORRESPONDENCE (OUTPATIENT)
Dept: HEALTH INFORMATION MANAGEMENT | Facility: CLINIC | Age: 60
End: 2020-02-06

## 2020-02-08 ASSESSMENT — ENCOUNTER SYMPTOMS
CONSTIPATION: 0
FEVER: 1
BLOOD IN STOOL: 0
HYPERTENSION: 0
FLANK PAIN: 0
PALPITATIONS: 1
HEADACHES: 0
NUMBNESS: 0
ORTHOPNEA: 0
PARALYSIS: 1
BOWEL INCONTINENCE: 0
DIZZINESS: 0
WEIGHT GAIN: 0
DISTURBANCES IN COORDINATION: 0
FATIGUE: 1
RECTAL PAIN: 1
NAUSEA: 1
DYSURIA: 1
SYNCOPE: 0
INCREASED ENERGY: 1
WEIGHT LOSS: 0
LOSS OF CONSCIOUSNESS: 0
TINGLING: 0
TREMORS: 0
BLOATING: 0
SPEECH CHANGE: 0
DIFFICULTY URINATING: 1
DIARRHEA: 0
POLYDIPSIA: 0
ABDOMINAL PAIN: 1
JAUNDICE: 0
WEAKNESS: 1
LEG PAIN: 0
SEIZURES: 0
LIGHT-HEADEDNESS: 0
HYPOTENSION: 1
HEARTBURN: 1
DECREASED APPETITE: 1
ALTERED TEMPERATURE REGULATION: 1
CHILLS: 1
NIGHT SWEATS: 0
SLEEP DISTURBANCES DUE TO BREATHING: 0
MEMORY LOSS: 0
HEMATURIA: 0
POLYPHAGIA: 0
HALLUCINATIONS: 0
VOMITING: 0
EXERCISE INTOLERANCE: 0

## 2020-02-11 DIAGNOSIS — R97.20 ELEVATED PROSTATE SPECIFIC ANTIGEN (PSA): Primary | ICD-10-CM

## 2020-02-12 ENCOUNTER — TELEPHONE (OUTPATIENT)
Dept: UROLOGY | Facility: CLINIC | Age: 60
End: 2020-02-12

## 2020-02-12 ENCOUNTER — OFFICE VISIT (OUTPATIENT)
Dept: UROLOGY | Facility: CLINIC | Age: 60
End: 2020-02-12
Payer: MEDICARE

## 2020-02-12 VITALS
HEIGHT: 70 IN | OXYGEN SATURATION: 98 % | BODY MASS INDEX: 21.47 KG/M2 | SYSTOLIC BLOOD PRESSURE: 100 MMHG | DIASTOLIC BLOOD PRESSURE: 60 MMHG | HEART RATE: 84 BPM | WEIGHT: 150 LBS

## 2020-02-12 DIAGNOSIS — N30.00 ACUTE CYSTITIS WITHOUT HEMATURIA: Primary | ICD-10-CM

## 2020-02-12 PROCEDURE — 99204 OFFICE O/P NEW MOD 45 MIN: CPT | Performed by: UROLOGY

## 2020-02-12 RX ORDER — SULFAMETHOXAZOLE AND TRIMETHOPRIM 400; 80 MG/1; MG/1
1 TABLET ORAL DAILY
Qty: 90 TABLET | Refills: 3 | Status: SHIPPED | OUTPATIENT
Start: 2020-02-12 | End: 2023-06-07

## 2020-02-12 ASSESSMENT — PAIN SCALES - GENERAL: PAINLEVEL: NO PAIN (0)

## 2020-02-12 ASSESSMENT — MIFFLIN-ST. JEOR: SCORE: 1506.65
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
## 2020-02-12 NOTE — TELEPHONE ENCOUNTER
[facility] Call Center    Phone Message    May a detailed message be left on voicemail: yes     Reason for Call: Other: PT is requesting to have his office notes from today faxed to his referring doctor at [place], Dr. Venegas.  PT did not know the fax number.  Please follow up if needed.      Action Taken: Message routed to:  [facility]  urology    Travel Screening: Not Applicable

## 2020-02-14 NOTE — TELEPHONE ENCOUNTER
[facility] Call Center    Phone Message    May a detailed message be left on voicemail: yes     Reason for Call: Other:       [place] called back to provide a fax number.     [phone].        Action Taken: Message routed to:  Other: [facility]    Travel Screening: Not Applicable

## 2020-03-17 ENCOUNTER — TELEPHONE (OUTPATIENT)
Dept: SLEEP MEDICINE | Facility: CLINIC | Age: 60
End: 2020-03-17

## 2020-03-17 DIAGNOSIS — J96.11 CHRONIC RESPIRATORY FAILURE WITH HYPOXIA AND HYPERCAPNIA (H): Primary | ICD-10-CM

## 2020-03-17 DIAGNOSIS — J96.12 CHRONIC RESPIRATORY FAILURE WITH HYPOXIA AND HYPERCAPNIA (H): Primary | ICD-10-CM

## 2020-03-17 NOTE — TELEPHONE ENCOUNTER
Patient had some questions/concerns regarding if he was to contract COVID-19. Informed him if he were to get COVID-19 and his symptoms are minimal, his bipap machine should be sufficient in providing him ventilation support, however, settings may need to be adjusted. Patient wanting to make adjustments to settings currently. He states he has difficulty exhaling against the pressure and felt better when his pressures when inhaling were higher. Suggesting keeping EPAP at 6, increase IPAP to 10-12 from 8-10, and increase Vt to 300. Will follow up with Dr. Martinez to see if he's ok with this before I can make any changes. Patient expressed understanding. Will follow up when I receive an answer, however, I will be out of the office this afternoon.

## 2020-03-17 NOTE — TELEPHONE ENCOUNTER
Changed settings remotely via [employer]. Emailed patient that settings have been changed and will update when modem calls again tomorrow. Instructed patient to call me if he has any questions/concerns.

## 2020-04-02 ENCOUNTER — TELEPHONE (OUTPATIENT)
Dept: UROLOGY | Facility: CLINIC | Age: 60
End: 2020-04-02

## 2020-04-02 NOTE — TELEPHONE ENCOUNTER
[facility] Call Center    Phone Message    May a detailed message be left on voicemail: yes     Reason for Call: Medication Question or concern regarding medication   Prescription Clarification  Name of Medication: Vitamin C  Prescribing Provider: Dr. Hebert   Pharmacy: On File    What on the order needs clarification? Pt is requesting a call back to discuss if he can take buffered Vitamin C instead or the normal Vitamin C due to a stomach ulcer. Please advise.           Action Taken: Message routed to:  [facility] Uro    Travel Screening: Not Applicable

## 2020-04-15 ENCOUNTER — TELEPHONE (OUTPATIENT)
Dept: SLEEP MEDICINE | Facility: CLINIC | Age: 60
End: 2020-04-15

## 2020-04-15 ENCOUNTER — MYC MEDICAL ADVICE (OUTPATIENT)
Dept: SLEEP MEDICINE | Facility: CLINIC | Age: 60
End: 2020-04-15

## 2020-04-15 DIAGNOSIS — G47.33 OSA (OBSTRUCTIVE SLEEP APNEA): Primary | ICD-10-CM

## 2020-04-15 NOTE — TELEPHONE ENCOUNTER
Patient emailed me asking that I call him. Called patient per his request and he would like to adjust his settings again. Patient states since the last change, things have been going well. Patient has concerns of getting COVID-19 and is worried about his settings not being able to meet his needs if he was to get it. I explained to patient that based on his download, he seems to be doing well. However, I understand his concern, but it's difficult to determine what his needs would be if he was to contract COVID-19. Patient expressed understanding, however, still is interested in adjusting his pressures slightly. Patient has suggested keeping his EPAP at 6, adjusing IPAP from 10-12 to 12-14, increasing Vt from 300 to 350, and keeping RR at 8. I have adjusted his humidity level to 5 from 4 per patient request and patient states he's comfortable with humidity. Will reach out to Dr. Martinez and provide patient with an update once I receive an answer.

## 2020-04-15 NOTE — TELEPHONE ENCOUNTER
Patient called back with concerns regarding the Velcro straps on his mask. He states they don't attach well and if there are any masks that the Velcro attaches to another piece of Velcro. Informed patient that all of our masks by manufacturers have the Velcro attaching to only the fabric part of the mask. Patient stated this is a poor design and will work with it. Patient asked that I resend download to him as he did not receive it. Resent. Changed patients pressures via encore and updated patient via email.

## 2020-09-15 LAB
CREAT SERPL-MCNC: 0.59 MG/DL (ref 0.7–1.3)
GFR SERPL CREATININE-BSD FRML MDRD: >60 ML/MIN/1.73M^2
GLUCOSE SERPL-MCNC: 81 MG/DL (ref 70–124)
POTASSIUM SERPL-SCNC: 4.2 MMOL/L (ref 3.4–5.3)

## 2020-10-05 ENCOUNTER — TRANSFERRED RECORDS (OUTPATIENT)
Dept: HEALTH INFORMATION MANAGEMENT | Facility: CLINIC | Age: 60
End: 2020-10-05

## 2020-10-05 LAB
CREAT SERPL-MCNC: 0.55 MG/DL (ref 0.7–1.3)
GFR SERPL CREATININE-BSD FRML MDRD: >60 ML/MIN/1.73M^2
GLUCOSE SERPL-MCNC: 94 MG/DL (ref 70–124)
POTASSIUM SERPL-SCNC: 4.1 MMOL/L (ref 3.4–5.3)

## 2020-10-26 ENCOUNTER — TELEPHONE (OUTPATIENT)
Dept: UROLOGY | Facility: CLINIC | Age: 60
End: 2020-10-26

## 2020-10-26 DIAGNOSIS — N41.9 PROSTATITIS: Primary | ICD-10-CM

## 2020-10-26 RX ORDER — SULFAMETHOXAZOLE/TRIMETHOPRIM 800-160 MG
1 TABLET ORAL 2 TIMES DAILY
Qty: 60 TABLET | Refills: 0 | Status: SHIPPED | OUTPATIENT
Start: 2020-10-26 | End: 2020-11-25

## 2020-10-26 NOTE — TELEPHONE ENCOUNTER
Per Md-Patient may start on Bactrim DS twice a day for four weeks. Sent this to patient's preferred pharmacy. Also will send message to 's to arrange a sooner appointment.     Jaclyn Panchal LPN

## 2020-10-26 NOTE — TELEPHONE ENCOUNTER
[facility] Call Center    Phone Message    May a detailed message be left on voicemail: yes     Reason for Call: Other: Joao calling to make an appointment with Dr. Hebert. Joao says he has been feeling pain in his pelvic area that he believes is because of his prostate. Joao is a quadriplegic, so he says that it has to be an intense pain in order for him to feel it, which he is. Joao says he notices that the pain changes when he urinates, so that why he thinks it is due to his prostate. Joao has been on antibiotic for four weeks, but the pain still hasn't gone away. The next available appointment with Dr. Hebert is for 2/1/20, but Joao says he cannot wait that long. please give Joao a call back at your earliest convenience to discuss.     Action Taken: Message routed to:  Other: [facility]    Travel Screening: Not Applicable

## 2020-10-27 ENCOUNTER — TRANSFERRED RECORDS (OUTPATIENT)
Dept: HEALTH INFORMATION MANAGEMENT | Facility: CLINIC | Age: 60
End: 2020-10-27

## 2020-10-27 ENCOUNTER — MEDICAL CORRESPONDENCE (OUTPATIENT)
Dept: HEALTH INFORMATION MANAGEMENT | Facility: CLINIC | Age: 60
End: 2020-10-27

## 2020-10-30 NOTE — TELEPHONE ENCOUNTER
"Per MD-when asked if patient needs imaging now he stated -\"Suggest we defer visit for 6 months if no problems or schedule video visit.\"    Jaclyn Panchal LPN    "

## 2020-11-04 ENCOUNTER — TELEPHONE (OUTPATIENT)
Dept: UROLOGY | Facility: CLINIC | Age: 60
End: 2020-11-04

## 2020-11-04 DIAGNOSIS — R97.20 ELEVATED PROSTATE SPECIFIC ANTIGEN (PSA): Primary | ICD-10-CM

## 2020-11-04 NOTE — TELEPHONE ENCOUNTER
[facility] Call Center    Phone Message    May a detailed message be left on voicemail: yes     Reason for Call: Other: Joao calling to request a call back from Dr. Hebert's nurses. Joao says his prostate pain is getting worse. Yesterday (11/4/20) Joao was having a headache and cloudy vision, because of the pain. Joao also mentioned that he could only sit for 3 hours instead of his normal 7 in his power wheelchair. Joao wanted it noted that because of his spinal cord injury, he is not able to feel below the collar bone, so he says the pain must be bad if he is feeling it. Joao would like to know if Dr. Hebert wants him to come in for a rectal exam, or if Joao should have imaging first. Please give Joao a call back at your earliest convenience to discuss.     Action Taken: Message routed to:  Other: [facility]    Travel Screening: Not Applicable

## 2020-11-05 DIAGNOSIS — R97.20 ELEVATED PSA: Primary | ICD-10-CM

## 2020-11-05 DIAGNOSIS — N41.0 ACUTE PROSTATITIS: ICD-10-CM

## 2020-11-05 RX ORDER — KETOROLAC TROMETHAMINE 10 MG/1
10 TABLET, FILM COATED ORAL EVERY 6 HOURS PRN
Qty: 10 TABLET | Refills: 0 | Status: SHIPPED | OUTPATIENT
Start: 2020-11-05 | End: 2021-06-24

## 2020-11-05 NOTE — TELEPHONE ENCOUNTER
I spoke with Dr. Hebert and he ordered a US and KUB and PSA test.   Dr. Hebert also sent an Rx for toradol.  Pt notified of all above info.  Once US and KUB and PSA are done he will have a video visit with Dr. Hebert and make a plan from there.  All orders are in epic.  Pt will call us when needed and/or when things are scheduled to make a video appt.  GENI Munoz CMA

## 2020-11-05 NOTE — TELEPHONE ENCOUNTER
[facility] Call Center    Phone Message    May a detailed message be left on voicemail: yes     Reason for Call: Other: Joao calling back in to get an update from [facility] since speaking with her yesterday (11/4/20). Joao said he was told he would hear back today (11/5/20), so he is hoping to hear an update. Please give Joao a call back at your earliest convenience.     Action Taken: Message routed to:  Other: [facility]    Travel Screening: Not Applicable

## 2020-11-06 ENCOUNTER — HOSPITAL ENCOUNTER (OUTPATIENT)
Dept: LAB | Facility: CLINIC | Age: 60
End: 2020-11-06
Attending: UROLOGY
Payer: MEDICARE

## 2020-11-06 ENCOUNTER — HOSPITAL ENCOUNTER (OUTPATIENT)
Dept: GENERAL RADIOLOGY | Facility: CLINIC | Age: 60
End: 2020-11-06
Attending: UROLOGY
Payer: MEDICARE

## 2020-11-06 ENCOUNTER — HOSPITAL ENCOUNTER (OUTPATIENT)
Dept: ULTRASOUND IMAGING | Facility: CLINIC | Age: 60
End: 2020-11-06
Attending: UROLOGY
Payer: MEDICARE

## 2020-11-06 DIAGNOSIS — R97.20 ELEVATED PSA: ICD-10-CM

## 2020-11-06 DIAGNOSIS — N41.0 ACUTE PROSTATITIS: ICD-10-CM

## 2020-11-06 LAB — PSA SERPL-MCNC: 1.79 UG/L (ref 0–4)

## 2020-11-06 PROCEDURE — 74019 RADEX ABDOMEN 2 VIEWS: CPT

## 2020-11-06 PROCEDURE — 76770 US EXAM ABDO BACK WALL COMP: CPT

## 2020-11-06 PROCEDURE — 36415 COLL VENOUS BLD VENIPUNCTURE: CPT | Performed by: UROLOGY

## 2020-11-06 PROCEDURE — 84153 ASSAY OF PSA TOTAL: CPT | Performed by: UROLOGY

## 2020-11-09 ENCOUNTER — VIRTUAL VISIT (OUTPATIENT)
Dept: UROLOGY | Facility: CLINIC | Age: 60
End: 2020-11-09
Payer: MEDICARE

## 2020-11-09 VITALS — WEIGHT: 150 LBS | HEIGHT: 70 IN | BODY MASS INDEX: 21.47 KG/M2

## 2020-11-09 DIAGNOSIS — R14.0 ABDOMINAL DISTENTION: Primary | ICD-10-CM

## 2020-11-09 DIAGNOSIS — G82.50 QUADRIPLEGIA (H): ICD-10-CM

## 2020-11-09 PROCEDURE — 99442 PR PHYSICIAN TELEPHONE EVALUATION 11-20 MIN: CPT | Performed by: UROLOGY

## 2020-11-09 ASSESSMENT — PAIN SCALES - GENERAL: PAINLEVEL: MODERATE PAIN (5)

## 2020-11-09 ASSESSMENT — MIFFLIN-ST. JEOR: SCORE: 1501.65
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
## 2020-11-09 NOTE — NURSING NOTE
Has had prostate pain for several months. On Bactrim DS . Has some weak stream at times .Roslyn Pelayo LPN

## 2020-11-12 ENCOUNTER — ANCILLARY PROCEDURE (OUTPATIENT)
Dept: CT IMAGING | Facility: CLINIC | Age: 60
End: 2020-11-12
Attending: UROLOGY
Payer: MEDICARE

## 2020-11-12 DIAGNOSIS — R14.0 ABDOMINAL DISTENTION: ICD-10-CM

## 2020-11-12 DIAGNOSIS — G82.50 QUADRIPLEGIA (H): ICD-10-CM

## 2020-11-12 LAB
CREAT BLD-MCNC: 0.5 MG/DL (ref 0.66–1.25)
GFR SERPL CREATININE-BSD FRML MDRD: >90 ML/MIN/{1.73_M2}

## 2020-11-12 PROCEDURE — 36415 COLL VENOUS BLD VENIPUNCTURE: CPT | Performed by: PATHOLOGY

## 2020-11-12 PROCEDURE — 82565 ASSAY OF CREATININE: CPT | Performed by: PATHOLOGY

## 2020-11-12 PROCEDURE — 74177 CT ABD & PELVIS W/CONTRAST: CPT | Mod: GC | Performed by: RADIOLOGY

## 2020-11-12 RX ORDER — IOPAMIDOL 755 MG/ML
92 INJECTION, SOLUTION INTRAVASCULAR ONCE
Status: COMPLETED | OUTPATIENT
Start: 2020-11-12 | End: 2020-11-12

## 2020-11-12 RX ADMIN — IOPAMIDOL 92 ML: 755 INJECTION, SOLUTION INTRAVASCULAR at 16:40

## 2020-11-14 DIAGNOSIS — I31.39 PERICARDIAL EFFUSION: Primary | ICD-10-CM

## 2020-11-30 ENCOUNTER — ANCILLARY PROCEDURE (OUTPATIENT)
Dept: CARDIOLOGY | Facility: CLINIC | Age: 60
End: 2020-11-30
Payer: MEDICARE

## 2020-11-30 DIAGNOSIS — I31.39 PERICARDIAL EFFUSION: ICD-10-CM

## 2020-11-30 PROCEDURE — 93306 TTE W/DOPPLER COMPLETE: CPT | Mod: 26 | Performed by: INTERNAL MEDICINE

## 2020-12-18 ENCOUNTER — TELEPHONE (OUTPATIENT)
Dept: UROLOGY | Facility: CLINIC | Age: 60
End: 2020-12-18

## 2020-12-18 NOTE — TELEPHONE ENCOUNTER
Urology pt of Dr. Hebert last seen 11/9/2020. Dr. Hebert had ordered an Echocardiogram for Alex after consulting with a Cardiologist. Pt now calling for results.  Per Dr. Hebert, Alex should call his Cardiologist. Called pt with this direction but he relates that he doesn't have a Cardiologist and was under the impression that Dr. Hebert was going to arrange this.   Spoke with Dr. Hebert who advises pt be seen by [facility]. Called that clinic to facilitate. Spoke with Brenda. Pt should call their clinic at [phone] and ask for her. She will help him get scheduled.  Returned call to Alex with this information and he already had an in-coming call from Brenda while I was on the phone with him.   ALEKSANDR Miller RN      [facility]    Phone Message    May a detailed message be left on voicemail: yes     Reason for Call: Requesting Results   Name/type of test: ECHO  Date of test: 11/30/2020  Was test done at a location other than [facility] (Please fill in the location if not [facility])?: No      Action Taken: Message routed to:  [facility] ([facility]ro    Travel Screening: Not Applicable

## 2020-12-21 ENCOUNTER — MEDICAL CORRESPONDENCE (OUTPATIENT)
Dept: HEALTH INFORMATION MANAGEMENT | Facility: CLINIC | Age: 60
End: 2020-12-21

## 2020-12-21 ENCOUNTER — TRANSFERRED RECORDS (OUTPATIENT)
Dept: HEALTH INFORMATION MANAGEMENT | Facility: CLINIC | Age: 60
End: 2020-12-21

## 2020-12-24 ENCOUNTER — TRANSCRIBE ORDERS (OUTPATIENT)
Dept: OTHER | Age: 60
End: 2020-12-24

## 2020-12-24 DIAGNOSIS — I31.39 PERICARDIAL EFFUSION: Primary | ICD-10-CM

## 2020-12-29 NOTE — TELEPHONE ENCOUNTER
RECORDS RECEIVED FROM:   DATE RECEIVED:   NOTES STATUS DETAILS   OFFICE NOTE from referring provider    Care Everywhere Ricco scanned in   OFFICE NOTE from other cardiologist    N/A    DISCHARGE SUMMARY from hospital    N/A    DISCHARGE REPORT from the ER   N/A    OPERATIVE REPORT    N/A    MEDICATION LIST   Internal    LABS     BMP   N/A    CBC   N/A    CMP   N/A    Lipids   N/A    TSH   N/A    DIAGNOSTIC PROCEDURES     EKG   N/A    Monitor Reports   N/A    IMAGING (DISC & REPORT)      Echo   Internal 11-30-20   Stress Tests   N/A    Cath   N/A    MRI/MRA   N/A    CT/CTA   N/A

## 2021-01-04 ENCOUNTER — VIRTUAL VISIT (OUTPATIENT)
Dept: CARDIOLOGY | Facility: CLINIC | Age: 61
End: 2021-01-04
Attending: INTERNAL MEDICINE
Payer: MEDICARE

## 2021-01-04 ENCOUNTER — PRE VISIT (OUTPATIENT)
Dept: CARDIOLOGY | Facility: CLINIC | Age: 61
End: 2021-01-04

## 2021-01-04 DIAGNOSIS — E78.5 HYPERLIPIDEMIA LDL GOAL <100: Primary | ICD-10-CM

## 2021-01-04 PROCEDURE — 99204 OFFICE O/P NEW MOD 45 MIN: CPT | Performed by: INTERNAL MEDICINE

## 2021-01-04 NOTE — PATIENT INSTRUCTIONS
Patient Instructions:  It was a pleasure to see you in the cardiology clinic today.      If you have any questions, you can reach my nurse, Nichole CARRILLO LPN, at [phone].  Press Option #1 for the [facility], and then press Option #4 for nursing.    We are encouraging the use of [employer] to communicate with your HealthCare Provider    Medication Changes: None.    Recommendations: Fasting labs when you feel safe with COVID-19.    Studies Ordered: CT Coronary Calcium Scan when you feel safe with COVID-19.    The results from today include: Echocardiogram.    Please follow up: With Dr. Valladares based on results of testing.    Sincerely,    Catarino Valladares MD     If you have an urgent need after hours (8:00 am to 4:30 pm) please call [phone] and ask for the cardiology fellow on call.

## 2021-01-04 NOTE — PROGRESS NOTES
"HPI:     I had the privilege to evaluate Mr Jerrod Rodríguez, who is a 60 yr male patient with quadriplegia.    Patient was referred by [facility].  Patient had a CT scan in which pericardial fluid was described.    Patient has no cardiovascular symptoms - no chest pain, shortness of breath, palpitations.          PAST MEDICAL HISTORY:  Past Medical History:   Diagnosis Date     History of spinal cord injury 06/28/1980     Neuromuscular disorder (H)      Unspecified site of spinal cord injury without evidence of spinal bone injury     C 5-6       CURRENT MEDICATIONS:  Current Outpatient Medications   Medication Sig Dispense Refill     COMPRESSION STOCKINGS 1 each daily Compression stockings, knee high to be worn during days, ok off at night. Medium 20-30 mmHg compression. 3 pair. 6 refills. 3 each 6     TERAZOSIN HCL PO Take 5 mg by mouth daily.       acidophilus (BACID) 10 MG CAPS Take 1 capsule by mouth 2 times daily.         bisacodyl (DULCOLAX) 5 MG EC tablet Take 1 tablet (5 mg) by mouth See Admin Instructions Refer to \"Getting Ready for a Colonoscopy\" patient handout 2 tablet 0     fluticasone (FLONASE) 50 MCG/ACT nasal spray Spray 2 sprays into both nostrils daily.         glycopyrrolate (ROBINUL) 1 MG tablet Take 1 mg by mouth 4 times daily       ketorolac (TORADOL) 10 MG tablet Take 1 tablet (10 mg) by mouth every 6 hours as needed for moderate pain (Patient not taking: Reported on 11/9/2020) 10 tablet 0     magnesium citrate solution Take 296 mLs by mouth See Admin Instructions Refer to \"Getting Ready for a Colonoscopy\" patient handout. 296 mL 0     multivitamin, therapeutic with minerals (THERA-VIT-M) TABS Take 1 tablet by mouth daily.         Nitrofurantoin Macrocrystal (MACRODANTIN PO) Take 100 mg in the morning and 200 mg in the evening.       polyethylene glycol (MIRALAX) packet Take 238 g by mouth See Admin Instructions One 8.3-ounce bottle (238 g).  Refer to \"Getting Ready for a " "Colonoscopy\" patient handout 238 g 0     polyethylene glycol (MIRALAX/GLYCOLAX) packet Take 1 packet by mouth 2 times daily.         psyllium (METAMUCIL) 58.6 % packet Take 1 packet by mouth 2 times daily.         sulfamethoxazole-trimethoprim (BACTRIM/SEPTRA) 400-80 MG tablet Take 1 tablet by mouth daily 90 tablet 3       PAST SURGICAL HISTORY:  Past Surgical History:   Procedure Laterality Date     BACK SURGERY       GENITOURINARY SURGERY       SOFT TISSUE SURGERY         ALLERGIES     Allergies   Allergen Reactions     Augmentin Nausea and Vomiting       FAMILY HISTORY:  Family History   Problem Relation Age of Onset     Colon Polyps Father      Sleep Apnea Father      Ulcerative Colitis Paternal Grandfather         and paternal grandmother     Sleep Apnea Sister      Anesthesia Reaction No family hx of      Crohn's Disease No family hx of      Cancer No family hx of        SOCIAL HISTORY:  Social History     Socioeconomic History     Marital status: Single     Spouse name: Not on file     Number of children: Not on file     Years of education: Not on file     Highest education level: Not on file   Occupational History     Not on file   Social Needs     Financial resource strain: Not on file     Food insecurity     Worry: Not on file     Inability: Not on file     Transportation needs     Medical: Not on file     Non-medical: Not on file   Tobacco Use     Smoking status: Never Smoker     Smokeless tobacco: Never Used   Substance and Sexual Activity     Alcohol use: Yes     Drug use: No     Sexual activity: Not Currently   Lifestyle     Physical activity     Days per week: Not on file     Minutes per session: Not on file     Stress: Not on file   Relationships     Social connections     Talks on phone: Not on file     Gets together: Not on file     Attends Judaism service: Not on file     Active member of club or organization: Not on file     Attends meetings of clubs or organizations: Not on file     " Relationship status: Not on file     Intimate partner violence     Fear of current or ex partner: Not on file     Emotionally abused: Not on file     Physically abused: Not on file     Forced sexual activity: Not on file   Other Topics Concern     Parent/sibling w/ CABG, MI or angioplasty before 65F 55M? No   Social History Narrative     Not on file       ROS:   Constitutional: No fever, chills, or sweats. No weight gain/loss   ENT: No visual disturbance, ear ache, epistaxis, sore throat  Allergies/Immunologic: Negative.   Respiratory: No cough, hemoptysia  Cardiovascular: As per HPI  GI: No nausea, vomiting, hematemesis, melena, or hematochezia  : No urinary frequency, dysuria, or hematuria  Integument: Negative  Psychiatric: Negative  Neuro: Negative  Endocrinology: Negative   Musculoskeletal: Negative    EXAM:  There were no vitals taken for this visit.  .    Labs:  LIPID RESULTS:  Lipids were elevated.    LIVER ENZYME RESULTS:  Lab Results   Component Value Date    AST 20 02/20/2013    ALT 32 02/20/2013       CBC RESULTS:  Lab Results   Component Value Date    WBC 6.3 02/20/2013    RBC 4.35 (L) 02/20/2013    HGB 13.7 02/20/2013    HCT 39.7 (L) 02/20/2013    MCV 91 02/20/2013    MCH 31.5 02/20/2013    MCHC 34.5 02/20/2013    RDW 12.6 02/20/2013     02/20/2013       BMP RESULTS:  Lab Results   Component Value Date     02/20/2013    POTASSIUM 4.3 02/20/2013    CHLORIDE 101 02/20/2013    CO2 26 02/20/2013    ANIONGAP 12 02/20/2013    GLC 89 02/20/2013    BUN 11 02/20/2013    CR 0.51 (L) 02/20/2013    GFRESTIMATED >90 11/12/2020    GFRESTBLACK >90 11/12/2020    GABINO 8.8 02/20/2013        Procedures:    Echocardiogram 11-30-20    Trivial pericardial effusion is present.  Global and regional left ventricular function is normal with an EF of 60-65%.  Right ventricular function, chamber size, wall motion, and thickness are  normal.      _____________________________________________________________________________  __        Left Ventricle  Global and regional left ventricular function is normal with an EF of 60-65%.  Left ventricular wall thickness is normal. Left ventricular size is normal.  Left ventricular diastolic function is not assessable. No regional wall motion  abnormalities are seen.     Right Ventricle  Right ventricular function, chamber size, wall motion, and thickness are  normal.     Atria  Both atria appear normal.     Mitral Valve  The mitral valve is normal.        Aortic Valve  The valve leaflets are not well visualized. On Doppler interrogation, there is  no significant stenosis or regurgitation.     Tricuspid Valve  The tricuspid valve is normal. Pulmonary artery systolic pressure cannot be  assessed.     Pulmonic Valve  The pulmonic valve is normal.     Vessels  The thoracic aorta cannot be assessed. The pulmonary artery cannot be  assessed. The inferior vena cava cannot be assessed.     Pericardium  Trivial pericardial effusion is present.        Compared to Previous Study  There is no prior study for direct comparison.  _____________________________________________________________________________  __  MMode/2D Measurements & Calculations     IVSd: 1.4 cm  LVIDd: 2.7 cm  LVIDs: 1.9 cm  LVPWd: 1.4 cm  FS: 32.2 %  LV mass(C)d: 120.1 grams  LV mass(C)dI: 65.0 grams/m2  Ao root diam: 3.1 cm  asc Aorta Diam: 2.7 cm  LVOT diam: 2.4 cm  LVOT area: 4.4 cm2  RWT: 1.0        Doppler Measurements & Calculations  MV E max tanvi: 72.0 cm/sec  MV A max tanvi: 71.3 cm/sec  MV E/A: 1.0  MV dec time: 0.28 sec  E/E' av.4  Lateral E/e': 15.4  Medial E/e': 17.4    Assessment and Plan:    We discussed the rsults with patient.  We discussed the importance of a heart healthy diet and lifestyle.    We reviewed echocardiogram with echo staff - there is only trivial pericardial fluid.    In the [facility]  records - patient had on a CT scan  some calcification  at femoral/-iliac arteries..    \    Medication Changes: None.     Recommendations: Fasting labs when you feel safe with COVID-19.     Studies Ordered: CT Coronary Calcium Scan when you feel safe with COVID-19.  thhe CAC score will provide us information in functuin     The results from today include: Echocardiogram.     Please follow up: With Dr. Valladares based on results of testing    Catarino Valladares MD, PhD  Professor of Medicine  Division of Cardiology            CC  Patient Care Team:  [facility] Gomez [facility] PCP [facility] Assigned Surgical Provider  Catarino Valladares MD as MD (Cardiovascular Disease)  SANDRA WOLFF

## 2021-01-04 NOTE — LETTER
"1/4/2021      RE: Alex Rodríguez  [address]  [place] [id number]       Dear Colleague,    Thank you for the opportunity to participate in the care of your patient, Alex Rodríguez, at the [facility] HEART [facility] at [facility]. Please see a copy of my visit note below.    HPI:     I had the privilege to evaluate Mr Jerrod Rodríguez, who is a 60 yr male patient with quadriplegia.    Patient was referred by [facility].  Patient had a CT scan in which pericardial fluid was described.    Patient has no cardiovascular symptoms - no chest pain, shortness of breath, palpitations.          PAST MEDICAL HISTORY:  Past Medical History:   Diagnosis Date     History of spinal cord injury 06/28/1980     Neuromuscular disorder (H)      Unspecified site of spinal cord injury without evidence of spinal bone injury     C 5-6       CURRENT MEDICATIONS:  Current Outpatient Medications   Medication Sig Dispense Refill     COMPRESSION STOCKINGS 1 each daily Compression stockings, knee high to be worn during days, ok off at night. Medium 20-30 mmHg compression. 3 pair. 6 refills. 3 each 6     TERAZOSIN HCL PO Take 5 mg by mouth daily.       acidophilus (BACID) 10 MG CAPS Take 1 capsule by mouth 2 times daily.         bisacodyl (DULCOLAX) 5 MG EC tablet Take 1 tablet (5 mg) by mouth See Admin Instructions Refer to \"Getting Ready for a Colonoscopy\" patient handout 2 tablet 0     fluticasone (FLONASE) 50 MCG/ACT nasal spray Spray 2 sprays into both nostrils daily.         glycopyrrolate (ROBINUL) 1 MG tablet Take 1 mg by mouth 4 times daily       ketorolac (TORADOL) 10 MG tablet Take 1 tablet (10 mg) by mouth every 6 hours as needed for moderate pain (Patient not taking: Reported on 11/9/2020) 10 tablet 0     magnesium citrate solution Take 296 mLs by mouth See Admin Instructions Refer to \"Getting Ready for a Colonoscopy\" patient handout. 296 mL 0 " "    multivitamin, therapeutic with minerals (THERA-VIT-M) TABS Take 1 tablet by mouth daily.         Nitrofurantoin Macrocrystal (MACRODANTIN PO) Take 100 mg in the morning and 200 mg in the evening.       polyethylene glycol (MIRALAX) packet Take 238 g by mouth See Admin Instructions One 8.3-ounce bottle (238 g).  Refer to \"Getting Ready for a Colonoscopy\" patient handout 238 g 0     polyethylene glycol (MIRALAX/GLYCOLAX) packet Take 1 packet by mouth 2 times daily.         psyllium (METAMUCIL) 58.6 % packet Take 1 packet by mouth 2 times daily.         sulfamethoxazole-trimethoprim (BACTRIM/SEPTRA) 400-80 MG tablet Take 1 tablet by mouth daily 90 tablet 3       PAST SURGICAL HISTORY:  Past Surgical History:   Procedure Laterality Date     BACK SURGERY       GENITOURINARY SURGERY       SOFT TISSUE SURGERY         ALLERGIES     Allergies   Allergen Reactions     Augmentin Nausea and Vomiting       FAMILY HISTORY:  Family History   Problem Relation Age of Onset     Colon Polyps Father      Sleep Apnea Father      Ulcerative Colitis Paternal Grandfather         and paternal grandmother     Sleep Apnea Sister      Anesthesia Reaction No family hx of      Crohn's Disease No family hx of      Cancer No family hx of        SOCIAL HISTORY:  Social History     Socioeconomic History     Marital status: Single     Spouse name: Not on file     Number of children: Not on file     Years of education: Not on file     Highest education level: Not on file   Occupational History     Not on file   Social Needs     Financial resource strain: Not on file     Food insecurity     Worry: Not on file     Inability: Not on file     Transportation needs     Medical: Not on file     Non-medical: Not on file   Tobacco Use     Smoking status: Never Smoker     Smokeless tobacco: Never Used   Substance and Sexual Activity     Alcohol use: Yes     Drug use: No     Sexual activity: Not Currently   Lifestyle     Physical activity     Days per week: " Not on file     Minutes per session: Not on file     Stress: Not on file   Relationships     Social connections     Talks on phone: Not on file     Gets together: Not on file     Attends Anglican service: Not on file     Active member of club or organization: Not on file     Attends meetings of clubs or organizations: Not on file     Relationship status: Not on file     Intimate partner violence     Fear of current or ex partner: Not on file     Emotionally abused: Not on file     Physically abused: Not on file     Forced sexual activity: Not on file   Other Topics Concern     Parent/sibling w/ CABG, MI or angioplasty before 65F 55M? No   Social History Narrative     Not on file       ROS:   Constitutional: No fever, chills, or sweats. No weight gain/loss   ENT: No visual disturbance, ear ache, epistaxis, sore throat  Allergies/Immunologic: Negative.   Respiratory: No cough, hemoptysia  Cardiovascular: As per HPI  GI: No nausea, vomiting, hematemesis, melena, or hematochezia  : No urinary frequency, dysuria, or hematuria  Integument: Negative  Psychiatric: Negative  Neuro: Negative  Endocrinology: Negative   Musculoskeletal: Negative    EXAM:  There were no vitals taken for this visit.  .    Labs:  LIPID RESULTS:  Lipids were elevated.    LIVER ENZYME RESULTS:  Lab Results   Component Value Date    AST 20 02/20/2013    ALT 32 02/20/2013       CBC RESULTS:  Lab Results   Component Value Date    WBC 6.3 02/20/2013    RBC 4.35 (L) 02/20/2013    HGB 13.7 02/20/2013    HCT 39.7 (L) 02/20/2013    MCV 91 02/20/2013    MCH 31.5 02/20/2013    MCHC 34.5 02/20/2013    RDW 12.6 02/20/2013     02/20/2013       BMP RESULTS:  Lab Results   Component Value Date     02/20/2013    POTASSIUM 4.3 02/20/2013    CHLORIDE 101 02/20/2013    CO2 26 02/20/2013    ANIONGAP 12 02/20/2013    GLC 89 02/20/2013    BUN 11 02/20/2013    CR 0.51 (L) 02/20/2013    GFRESTIMATED >90 11/12/2020    GFRESTBLACK >90 11/12/2020    GABINO 8.8  2013        Procedures:    Echocardiogram 20    Trivial pericardial effusion is present.  Global and regional left ventricular function is normal with an EF of 60-65%.  Right ventricular function, chamber size, wall motion, and thickness are  normal.     _____________________________________________________________________________  __        Left Ventricle  Global and regional left ventricular function is normal with an EF of 60-65%.  Left ventricular wall thickness is normal. Left ventricular size is normal.  Left ventricular diastolic function is not assessable. No regional wall motion  abnormalities are seen.     Right Ventricle  Right ventricular function, chamber size, wall motion, and thickness are  normal.     Atria  Both atria appear normal.     Mitral Valve  The mitral valve is normal.        Aortic Valve  The valve leaflets are not well visualized. On Doppler interrogation, there is  no significant stenosis or regurgitation.     Tricuspid Valve  The tricuspid valve is normal. Pulmonary artery systolic pressure cannot be  assessed.     Pulmonic Valve  The pulmonic valve is normal.     Vessels  The thoracic aorta cannot be assessed. The pulmonary artery cannot be  assessed. The inferior vena cava cannot be assessed.     Pericardium  Trivial pericardial effusion is present.        Compared to Previous Study  There is no prior study for direct comparison.  _____________________________________________________________________________  __  MMode/2D Measurements & Calculations     IVSd: 1.4 cm  LVIDd: 2.7 cm  LVIDs: 1.9 cm  LVPWd: 1.4 cm  FS: 32.2 %  LV mass(C)d: 120.1 grams  LV mass(C)dI: 65.0 grams/m2  Ao root diam: 3.1 cm  asc Aorta Diam: 2.7 cm  LVOT diam: 2.4 cm  LVOT area: 4.4 cm2  RWT: 1.0        Doppler Measurements & Calculations  MV E max tanvi: 72.0 cm/sec  MV A max tanvi: 71.3 cm/sec  MV E/A: 1.0  MV dec time: 0.28 sec  E/E' av.4  Lateral E/e': 15.4  Medial E/e': 17.4    Assessment and  Plan:    We discussed the rsults with patient.  We discussed the importance of a heart healthy diet and lifestyle.    We reviewed echocardiogram with echo staff - there is only trivial pericardial fluid.    In the [facility]  records - patient had on a CT scan some calcification  at femoral/-iliac arteries..    \    Medication Changes: None.     Recommendations: Fasting labs when you feel safe with COVID-19.     Studies Ordered: CT Coronary Calcium Scan when you feel safe with COVID-19.  thhe CAC score will provide us information in functuin     The results from today include: Echocardiogram.     Please follow up: With Dr. Valladares based on results of testing    Catarino Valladares MD, PhD  Professor of Medicine  Division of Cardiology        CC  Patient Care Team:  [facility] Md Gomez MD as PCP - Scott Delarosa MD as Assigned Surgical Provider  Catarino Valladares MD as MD (Cardiovascular Disease)  SANDRA WOLFF      Please do not hesitate to contact me if you have any questions/concerns.     Sincerely,     Catarino Valladares MD

## 2021-01-15 ENCOUNTER — HEALTH MAINTENANCE LETTER (OUTPATIENT)
Age: 61
End: 2021-01-15

## 2021-02-02 ENCOUNTER — TELEPHONE (OUTPATIENT)
Dept: CARDIOLOGY | Facility: CLINIC | Age: 61
End: 2021-02-02

## 2021-02-02 NOTE — TELEPHONE ENCOUNTER
[facility] Call Center    Phone Message    May a detailed message be left on voicemail: yes     Reason for Call: Other: PT would like a call from [facility] to discuss getting more test ordered     Action Taken: Message routed to:  [facility] Cardio    Travel Screening: Not Applicable

## 2021-02-05 NOTE — TELEPHONE ENCOUNTER
Called and spoke with Pt regarding sxs and testing.  Noted current testing completed and ordered do not show narrowing or blockage in coronary arteries.  Discussed this would be best assessed by stress testing or CTA.    Pt noted he has been having indigestion for 6-9 months.  He takes omeprazole 20mg and famotidine 20mg 30-60 minutes before both of my meals.  He notes this reduces his discomfort by about 80% most days.    He also noted he has intermittent chest tightness across his chest.  Pt denied anything that he is aware of that brings on discomfort; however, he noted it is alleviated with lying down.  Pt also noted he has had intermittent left arm pain.  He describes this as non-radiating in nature.  Previously pain was between elbow and wrist, but has move more towards his wrist.  Pt denied other cardiac sxs such as SOB, edema, etc.    Noted the above would be discussed with Dr Valladares and Pt would be contacted with recommendations. Pt verbalized understanding, agreed to current plan and denied any further questions.    Nichole Reich LPN

## 2021-02-10 ENCOUNTER — TRANSFERRED RECORDS (OUTPATIENT)
Dept: HEALTH INFORMATION MANAGEMENT | Facility: CLINIC | Age: 61
End: 2021-02-10

## 2021-02-17 DIAGNOSIS — R07.9 CHEST PAIN, UNSPECIFIED TYPE: Primary | ICD-10-CM

## 2021-02-17 NOTE — PROGRESS NOTES
Date: 2/17/2021    Time of Call: 11:08 AM     Diagnosis:  Chest pain     [ TORB ] Ordering provider: Dr Catarino Valladarse  Order:   - Discontinue CACS  - CT Coronary Angiogram     Order received by: Nichole Reich LPN     Follow-up/additional notes:

## 2021-03-04 ENCOUNTER — HOSPITAL ENCOUNTER (OUTPATIENT)
Dept: CT IMAGING | Facility: CLINIC | Age: 61
End: 2021-03-04
Attending: INTERNAL MEDICINE
Payer: MEDICARE

## 2021-03-04 VITALS — HEART RATE: 56 BPM | DIASTOLIC BLOOD PRESSURE: 76 MMHG | RESPIRATION RATE: 18 BRPM | SYSTOLIC BLOOD PRESSURE: 129 MMHG

## 2021-03-04 DIAGNOSIS — R97.20 ELEVATED PROSTATE SPECIFIC ANTIGEN (PSA): ICD-10-CM

## 2021-03-04 DIAGNOSIS — R07.9 CHEST PAIN, UNSPECIFIED TYPE: ICD-10-CM

## 2021-03-04 DIAGNOSIS — E78.5 HYPERLIPIDEMIA LDL GOAL <100: ICD-10-CM

## 2021-03-04 LAB
CHOLEST SERPL-MCNC: 169 MG/DL
HDLC SERPL-MCNC: 45 MG/DL
LDLC SERPL CALC-MCNC: 111 MG/DL
NONHDLC SERPL-MCNC: 124 MG/DL
PSA SERPL-MCNC: 0.92 UG/L (ref 0–4)
TRIGL SERPL-MCNC: 67 MG/DL

## 2021-03-04 PROCEDURE — 83695 ASSAY OF LIPOPROTEIN(A): CPT | Performed by: INTERNAL MEDICINE

## 2021-03-04 PROCEDURE — 84153 ASSAY OF PSA TOTAL: CPT | Performed by: INTERNAL MEDICINE

## 2021-03-04 PROCEDURE — G1004 CDSM NDSC: HCPCS | Performed by: STUDENT IN AN ORGANIZED HEALTH CARE EDUCATION/TRAINING PROGRAM

## 2021-03-04 PROCEDURE — 80061 LIPID PANEL: CPT | Performed by: INTERNAL MEDICINE

## 2021-03-04 PROCEDURE — 75574 CT ANGIO HRT W/3D IMAGE: CPT | Mod: MG

## 2021-03-04 PROCEDURE — 36415 COLL VENOUS BLD VENIPUNCTURE: CPT | Performed by: INTERNAL MEDICINE

## 2021-03-04 PROCEDURE — 75574 CT ANGIO HRT W/3D IMAGE: CPT | Mod: 26 | Performed by: STUDENT IN AN ORGANIZED HEALTH CARE EDUCATION/TRAINING PROGRAM

## 2021-03-04 PROCEDURE — 250N000013 HC RX MED GY IP 250 OP 250 PS 637: Performed by: STUDENT IN AN ORGANIZED HEALTH CARE EDUCATION/TRAINING PROGRAM

## 2021-03-04 PROCEDURE — 250N000011 HC RX IP 250 OP 636: Performed by: INTERNAL MEDICINE

## 2021-03-04 RX ORDER — IVABRADINE 5 MG/1
5-15 TABLET, FILM COATED ORAL
Status: DISCONTINUED | OUTPATIENT
Start: 2021-03-04 | End: 2021-03-05 | Stop reason: HOSPADM

## 2021-03-04 RX ORDER — DIPHENHYDRAMINE HYDROCHLORIDE 50 MG/ML
25-50 INJECTION INTRAMUSCULAR; INTRAVENOUS
Status: DISCONTINUED | OUTPATIENT
Start: 2021-03-04 | End: 2021-03-05 | Stop reason: HOSPADM

## 2021-03-04 RX ORDER — METOPROLOL TARTRATE 1 MG/ML
5-15 INJECTION, SOLUTION INTRAVENOUS
Status: DISCONTINUED | OUTPATIENT
Start: 2021-03-04 | End: 2021-03-05 | Stop reason: HOSPADM

## 2021-03-04 RX ORDER — ACYCLOVIR 200 MG/1
0-1 CAPSULE ORAL
Status: DISCONTINUED | OUTPATIENT
Start: 2021-03-04 | End: 2021-03-05 | Stop reason: HOSPADM

## 2021-03-04 RX ORDER — METOPROLOL TARTRATE 25 MG/1
25-100 TABLET, FILM COATED ORAL
Status: COMPLETED | OUTPATIENT
Start: 2021-03-04 | End: 2021-03-04

## 2021-03-04 RX ORDER — METHYLPREDNISOLONE SODIUM SUCCINATE 125 MG/2ML
125 INJECTION, POWDER, LYOPHILIZED, FOR SOLUTION INTRAMUSCULAR; INTRAVENOUS
Status: DISCONTINUED | OUTPATIENT
Start: 2021-03-04 | End: 2021-03-05 | Stop reason: HOSPADM

## 2021-03-04 RX ORDER — ONDANSETRON 2 MG/ML
4 INJECTION INTRAMUSCULAR; INTRAVENOUS
Status: DISCONTINUED | OUTPATIENT
Start: 2021-03-04 | End: 2021-03-05 | Stop reason: HOSPADM

## 2021-03-04 RX ORDER — DIPHENHYDRAMINE HCL 25 MG
25 CAPSULE ORAL
Status: DISCONTINUED | OUTPATIENT
Start: 2021-03-04 | End: 2021-03-05 | Stop reason: HOSPADM

## 2021-03-04 RX ORDER — IOPAMIDOL 755 MG/ML
120 INJECTION, SOLUTION INTRAVASCULAR ONCE
Status: COMPLETED | OUTPATIENT
Start: 2021-03-04 | End: 2021-03-04

## 2021-03-04 RX ORDER — NITROGLYCERIN 0.4 MG/1
.4-.8 TABLET SUBLINGUAL
Status: DISCONTINUED | OUTPATIENT
Start: 2021-03-04 | End: 2021-03-05 | Stop reason: HOSPADM

## 2021-03-04 RX ADMIN — METOPROLOL TARTRATE 50 MG: 50 TABLET, FILM COATED ORAL at 11:47

## 2021-03-04 RX ADMIN — IOPAMIDOL 120 ML: 755 INJECTION, SOLUTION INTRAVENOUS at 12:35

## 2021-03-04 RX ADMIN — NITROGLYCERIN 0.8 MG: 0.4 TABLET SUBLINGUAL at 13:11

## 2021-03-04 NOTE — PROGRESS NOTES
Pt arrived for Coronary CT angiogram. Test, meds and side effects reviewed with pt.  Resting HR 67 bpm. Given 50 mg PO Metoprolol per verbal order. Administered 0.8 mg SL nitro on CTA table per order. CTA completed.  Patient tolerated procedure well and denies symptoms of allergic reaction.  Post monitoring completed and VSS.  D/C instructions reviewed with pt whom verbalized understanding of need to increase PO fluids today. D/C to gold waiting room accompanied by staff.

## 2021-03-06 LAB — LPA SERPL-MCNC: <6 MG/DL

## 2021-03-15 ENCOUNTER — IMMUNIZATION (OUTPATIENT)
Dept: NURSING | Facility: CLINIC | Age: 61
End: 2021-03-15
Payer: MEDICARE

## 2021-03-15 PROCEDURE — 91300 PR COVID VAC PFIZER DIL RECON 30 MCG/0.3 ML IM: CPT

## 2021-03-15 PROCEDURE — 0001A PR COVID VAC PFIZER DIL RECON 30 MCG/0.3 ML IM: CPT

## 2021-03-29 ENCOUNTER — DOCUMENTATION ONLY (OUTPATIENT)
Dept: SLEEP MEDICINE | Facility: CLINIC | Age: 61
End: 2021-03-29

## 2021-03-29 NOTE — PROGRESS NOTES
STM recheck pt call       Data only recheck unable to leave voicemail.  See [employer] message regarding pressures.       Assessment: Pt not meeting objective benchmarks for AHI      Action plan: routing to provider for recommendations on pressures.     Device type: AVAPS/IVAPS    PAP settings:    EPAP Fixed 6    IPAP Min 12    IPAP Max 14    Rate 8    Vt 350          Mask type:  Nasal Mask    Objective measures: 14 day rolling measures         Compliance  100 %     % of night spent in large leak  0 % last  upload      AHI 7.39   last  upload      Average number of minutes 515          Objective measure goal  Compliance   Goal >70%  Leak   Goal < 10%  AHI  Goal < 5  Usage  Goal >240

## 2021-03-29 NOTE — Clinical Note
Alfred Martinez.  Can you please review [employer] message.  I was unable to reach patient today.  It looks like he is having some GI issues and is wondering about the pressures on his device. He is using a AVAPS device.  Settings are in my notes.  Please let me know if you want any changes.    Thanks    Mei

## 2021-03-31 ENCOUNTER — CARE COORDINATION (OUTPATIENT)
Dept: CARDIOLOGY | Facility: CLINIC | Age: 61
End: 2021-03-31

## 2021-03-31 NOTE — PROGRESS NOTES
"Patient had been referred to Cardiology after a pericardial effusion was noted on CT scan.  The effusion was noted by Dr. Valladares to be trivial.  Also  An abdominal CT in 11/20 showed calcification at the aortoiliac vasculature.  Per Dr. Valladares recommendations, patient had CTA angiogram which was totally normal with score of 0.  This had been relayed to the patient via [employer] but he had several other questions.    I called and spoke with patient today.  He had a question about the calcification in the aorta and whether it is a predictor for CAD. We reviewed the results of his CTA again and that it is normal without stenoses.  He mentioned he has had \"chest discomfort\" which he feels may be reflux. Encouraged him to follow up with his primary to see if needs further testing for the reflux.      Patient had no further questions.  "

## 2021-04-01 ENCOUNTER — TELEPHONE (OUTPATIENT)
Dept: SLEEP MEDICINE | Facility: CLINIC | Age: 61
End: 2021-04-01

## 2021-04-01 DIAGNOSIS — G47.33 OSA (OBSTRUCTIVE SLEEP APNEA): Primary | ICD-10-CM

## 2021-04-01 NOTE — TELEPHONE ENCOUNTER
Telephone call April 1    Patient is having suprasternal and mid abdominal wall discomfort in the setting of quadriplegia and limited sensory input.  He perceives this is over distention although he does not have consistent abdominal distention with his symptoms.  His device has been incrementally increased in minimum inspiratory positive airway pressure over the past year during the development of the symptoms.  This was done at the patient request in part because he was worried about respiratory failure from Covid.  He is preset at a tidal volume of 350 and we will reduce his inspiratory pressure from 12-8 to reduce upper airway and pharyngeal distention which may be contributing to possible air swallowing.  He will contact us over the next week regarding the impact of these changes.

## 2021-04-05 ENCOUNTER — IMMUNIZATION (OUTPATIENT)
Dept: NURSING | Facility: CLINIC | Age: 61
End: 2021-04-05
Attending: FAMILY MEDICINE
Payer: MEDICARE

## 2021-04-05 PROCEDURE — 0002A PR COVID VAC PFIZER DIL RECON 30 MCG/0.3 ML IM: CPT

## 2021-04-05 PROCEDURE — 91300 PR COVID VAC PFIZER DIL RECON 30 MCG/0.3 ML IM: CPT

## 2021-04-09 ENCOUNTER — MEDICAL CORRESPONDENCE (OUTPATIENT)
Dept: HEALTH INFORMATION MANAGEMENT | Facility: CLINIC | Age: 61
End: 2021-04-09

## 2021-04-09 ENCOUNTER — TRANSFERRED RECORDS (OUTPATIENT)
Dept: HEALTH INFORMATION MANAGEMENT | Facility: CLINIC | Age: 61
End: 2021-04-09

## 2021-04-12 ENCOUNTER — TRANSFERRED RECORDS (OUTPATIENT)
Dept: HEALTH INFORMATION MANAGEMENT | Facility: CLINIC | Age: 61
End: 2021-04-12

## 2021-04-14 ENCOUNTER — TELEPHONE (OUTPATIENT)
Dept: SURGERY | Facility: CLINIC | Age: 61
End: 2021-04-14

## 2021-04-14 ENCOUNTER — TRANSCRIBE ORDERS (OUTPATIENT)
Dept: OTHER | Age: 61
End: 2021-04-14

## 2021-04-14 DIAGNOSIS — K62.5 RECTAL BLEEDING: Primary | ICD-10-CM

## 2021-04-14 DIAGNOSIS — K64.9 HEMORRHOID: ICD-10-CM

## 2021-04-14 DIAGNOSIS — K62.89 RECTAL PAIN: ICD-10-CM

## 2021-04-14 NOTE — TELEPHONE ENCOUNTER
[facility] Call Center    Phone Message    May a detailed message be left on voicemail: yes     Reason for Call: Appointment Intake    Referring Provider Name: Referred by Dr. Karina Sr at [facility] to Dr. Bee     Diagnosis and/or Symptoms:  h/o hemorrhoids s/p excisional surgery in 2004 presents with recurrent rectal pain, hemorrhoids, rectal bleeding and three perianal lesions. Please review    Action Taken: Message routed to:  [facility] Colon Rectal    Travel Screening: Not Applicable

## 2021-04-14 NOTE — TELEPHONE ENCOUNTER
[facility] Call Center    Phone Message    May a detailed message be left on voicemail: yes     Reason for Call: Other: Patient is calling in looking to be scheduled to be seen for Rectal Pain. Patient has a referral from Dr. Karina Sr at [facility], and patient states that he would like to be seen by Dr. Bee because he has seen her in the past. Encounter is being sent as per guidelines. Please review and call back as soon as possible to discuss.    Action Taken: Message routed to:  [facility] Colon/Rectal    Travel Screening: Not Applicable

## 2021-04-14 NOTE — TELEPHONE ENCOUNTER
Spoke to patient about scheduling with Dr. Bee for colorectal referral. Patient was requesting to have a nurse from the clinic call to speak with him before he scheduled. Sent encounter to nurse to notify and requested they call patient back asap.

## 2021-04-15 NOTE — TELEPHONE ENCOUNTER
Patient wanted to know if he should do his bowel program before coming into the visit. He does have to digital disimpact himself. I told him he should do this the day of the visit. Set up visit.

## 2021-04-28 ASSESSMENT — ENCOUNTER SYMPTOMS
NAUSEA: 1
BLOOD IN STOOL: 0
JAUNDICE: 0
RECTAL PAIN: 1
BOWEL INCONTINENCE: 0
ABDOMINAL PAIN: 1
VOMITING: 0
CONSTIPATION: 0
BLOATING: 1
HEARTBURN: 1
DIARRHEA: 0

## 2021-05-11 NOTE — PROGRESS NOTES
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      HISTORY OF PRESENT ILNESS: History of surgical hemorrhoidectomy in .  I saw Joao back in  with external hemorrhoids.  He had never had a full colonoscopy so I did recommend a colonoscopy at that time.  He has not yet had this done. Family history of ulcerative colitis in a paternal aunt.    Interval History: Joao reports that he has had constant rectal pain for the past 5 years.  This has been getting progressively worse in the past 6 months.  He may be sees a small amount of bright red blood or a red edna about every 2 to 3 months but no significant bleeding.  The pain is fairly constant.  He was treated for 2 months with antibiotics for prostatitis.  He thinks that the pain improved somewhat but never completely resolved.  He also feels that his bowel program has been less predictable.  He is now doing a fleets enemas every 6 days and was not doing these previously.  He is doing Dulcolax suppositories with digital stimulation every 48 hours.  He is also noticed a small external growth that is always on the outside that he is concerned about.    PLEASE SEE NOTE BELOW FOR PHYSICAL EXAMINATION, REVIEW OF SYSTEMS, AND OTHER HISTORY.    Assessment/Plan: 60 year old C5-6 quadriplegic male with chronic rectal pain.  This has been getting progressively worse.      On exam today, he had a fairly tight anal sphincter and with digital rectal exam he passed a large amount of flatus and his rectum felt quite distended.      I wonder if his rectal pain he is due to gas in his rectum that he is not able to pass.  He reports that he passes gas in the morning  and at night when he is laying down but typically not during the day.  Discussed trying to get him on his side several times during the day with digitizing his rectum to try to help facilitate passing gas to see if this improves his rectal pain.      We also discussed using a muscle relaxer, although I am not sure that this would be particularly beneficial in relaxing his sphincter.      He could also try doing enemas more frequently to keep his bowels cleared out to see if this improves things.      He did have some improvement when he was treated for prostatitis so I would like him to follow-up with urology to ensure that he does not have some chronic prostatitis that is manifesting as rectal pain.      I would also, again, recommend a colonoscopy.  He has been having quite a bit of heartburn and chest pain and will be meeting with gastroenterology at the end of June to discuss possible upper endoscopy.  These could be done at the same time.      We briefly discussed that if his rectal pain is severe and seems to be related to inability to pass gas, that an ostomy could be a consideration in the future. Patient's questions were answered to his stated satisfaction and he is in agreement with this plan.    40 minutes spent on the date of the encounter doing chart review, history and exam, documentation and further activities as noted above.     Nelia Bee MD  Colon and Rectal Surgery Staff  [facility]      -------------------------------------------------------------------------------------------------------------------          Medical history:  Past Medical History:   Diagnosis Date     History of spinal cord injury 06/28/1980     Neuromuscular disorder (H)      Unspecified site of spinal cord injury without evidence of spinal bone injury     C 5-6       Surgical history:  Past Surgical History:   Procedure Laterality Date     BACK SURGERY       GENITOURINARY SURGERY        "SOFT TISSUE SURGERY         Problem list:    Patient Active Problem List    Diagnosis Date Noted     Autonomic dysfunction/dysregulation, possibly dysreflexia 11/11/2013     Priority: Medium     Tetraplegia (H) 11/11/2013     Priority: Medium     Neurogenic bladder 10/08/2012     Priority: Medium     Problem list name updated by automated process. Provider to review       UTI (urinary tract infection) 08/02/2012     Priority: Medium     Shoulder pain 10/12/2010     Priority: Medium     Impingement syndrome, shoulder 10/12/2010     Priority: Medium       Medications:  Current Outpatient Medications   Medication Sig Dispense Refill     COMPRESSION STOCKINGS 1 each daily Compression stockings, knee high to be worn during days, ok off at night. Medium 20-30 mmHg compression. 3 pair. 6 refills. 3 each 6     glycopyrrolate (ROBINUL) 1 MG tablet Take 1 mg by mouth 4 times daily       multivitamin, therapeutic with minerals (THERA-VIT-M) TABS Take 1 tablet by mouth daily.         polyethylene glycol (MIRALAX) packet Take 238 g by mouth See Admin Instructions One 8.3-ounce bottle (238 g).  Refer to \"Getting Ready for a Colonoscopy\" patient handout 238 g 0     psyllium (METAMUCIL) 58.6 % packet Take 1 packet by mouth 2 times daily.         sulfamethoxazole-trimethoprim (BACTRIM/SEPTRA) 400-80 MG tablet Take 1 tablet by mouth daily 90 tablet 3     TERAZOSIN HCL PO Take 5 mg by mouth daily.       acidophilus (BACID) 10 MG CAPS Take 1 capsule by mouth 2 times daily.         bisacodyl (DULCOLAX) 5 MG EC tablet Take 1 tablet (5 mg) by mouth See Admin Instructions Refer to \"Getting Ready for a Colonoscopy\" patient handout 2 tablet 0     fluticasone (FLONASE) 50 MCG/ACT nasal spray Spray 2 sprays into both nostrils daily.         ketorolac (TORADOL) 10 MG tablet Take 1 tablet (10 mg) by mouth every 6 hours as needed for moderate pain (Patient not taking: Reported on 11/9/2020) 10 tablet 0     magnesium citrate solution Take 296 mLs " "by mouth See Admin Instructions Refer to \"Getting Ready for a Colonoscopy\" patient handout. 296 mL 0     Nitrofurantoin Macrocrystal (MACRODANTIN PO) Take 100 mg in the morning and 200 mg in the evening.       polyethylene glycol (MIRALAX/GLYCOLAX) packet Take 1 packet by mouth 2 times daily.           Allergies:  Allergies   Allergen Reactions     Augmentin Nausea and Vomiting       Family history:  Family History   Problem Relation Age of Onset     Colon Polyps Father      Sleep Apnea Father      Ulcerative Colitis Paternal Grandfather         and paternal grandmother     Sleep Apnea Sister      Anesthesia Reaction No family hx of      Crohn's Disease No family hx of      Cancer No family hx of        Social history:  Social History     Socioeconomic History     Marital status: Single     Spouse name: Not on file     Number of children: Not on file     Years of education: Not on file     Highest education level: Not on file   Occupational History     Not on file   Social Needs     Financial resource strain: Not on file     Food insecurity     Worry: Not on file     Inability: Not on file     Transportation needs     Medical: Not on file     Non-medical: Not on file   Tobacco Use     Smoking status: Never Smoker     Smokeless tobacco: Never Used   Substance and Sexual Activity     Alcohol use: Yes     Drug use: No     Sexual activity: Not Currently   Lifestyle     Physical activity     Days per week: Not on file     Minutes per session: Not on file     Stress: Not on file   Relationships     Social connections     Talks on phone: Not on file     Gets together: Not on file     Attends Gnosticist service: Not on file     Active member of club or organization: Not on file     Attends meetings of clubs or organizations: Not on file     Relationship status: Not on file     Intimate partner violence     Fear of current or ex partner: Not on file     Emotionally abused: Not on file     Physically abused: Not on file     " Forced sexual activity: Not on file   Other Topics Concern     Parent/sibling w/ CABG, MI or angioplasty before 65F 55M? No   Social History Narrative     Not on file         Nursing Notes:   Lise Urbina  5/12/2021  4:17 PM  Signed  Chief Complaint   Patient presents with     New Patient     external hemorrhoids and pain        Vitals:    05/12/21 1614   BP: 94/65   BP Location: Right arm   Patient Position: Sitting   Cuff Size: Adult Regular   Pulse: 74   SpO2: 100%       There is no height or weight on file to calculate BMI.    Lise Urbina MA         Physical Examination:  BP 94/65 (BP Location: Right arm, Patient Position: Sitting, Cuff Size: Adult Regular)   Pulse 74   SpO2 100%   General: Pleasant, no acute distress. Needs significant assistance with transfers with quad status.  Abdomen: moderately distended.   Perianal external examination: Significant perineal dissent. Skin intact.  Digital rectal examination: Was performed.   Sphincter tone: very tight but not stenostic. Palpable lesions: No.  Significant gas out with digital rectal examination around index finger    Anoscopy: Was performed.   Hemorrhoids: No significant internal hemorrhoids.  Lesions: No.  Again, significant gas around this.

## 2021-05-12 ENCOUNTER — OFFICE VISIT (OUTPATIENT)
Dept: SURGERY | Facility: CLINIC | Age: 61
End: 2021-05-12
Payer: MEDICARE

## 2021-05-12 ENCOUNTER — PRE VISIT (OUTPATIENT)
Dept: SURGERY | Facility: CLINIC | Age: 61
End: 2021-05-12

## 2021-05-12 VITALS — OXYGEN SATURATION: 100 % | DIASTOLIC BLOOD PRESSURE: 65 MMHG | HEART RATE: 74 BPM | SYSTOLIC BLOOD PRESSURE: 94 MMHG

## 2021-05-12 DIAGNOSIS — K62.89 RECTAL PAIN: Primary | ICD-10-CM

## 2021-05-12 PROCEDURE — 99203 OFFICE O/P NEW LOW 30 MIN: CPT | Performed by: COLON & RECTAL SURGERY

## 2021-05-12 ASSESSMENT — PAIN SCALES - GENERAL: PAINLEVEL: MODERATE PAIN (5)

## 2021-05-12 NOTE — LETTER
2021       RE: Alex Rodríguez  [address]  [facility] [id number]     Dear Colleague,    Thank you for referring your patient, Alex Rodríguez, to the [facility] [place] at [facility]. Please see a copy of my visit note below.    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      HISTORY OF PRESENT ILNESS: History of surgical hemorrhoidectomy in .  I saw Joao back in  with external hemorrhoids.  He had never had a full colonoscopy so I did recommend a colonoscopy at that time.  He has not yet had this done. Family history of ulcerative colitis in a paternal aunt.    Interval History: Joao reports that he has had constant rectal pain for the past 5 years.  This has been getting progressively worse in the past 6 months.  He may be sees a small amount of bright red blood or a red edna about every 2 to 3 months but no significant bleeding.  The pain is fairly constant.  He was treated for 2 months with antibiotics for prostatitis.  He thinks that the pain improved somewhat but never completely resolved.  He also feels that his bowel program has been less predictable.  He is now doing a fleets enemas every 6 days and was not doing these previously.  He is doing Dulcolax suppositories with digital stimulation every 48 hours.  He is also noticed a small external growth that is always on the outside that he is concerned about.    PLEASE SEE NOTE BELOW FOR PHYSICAL EXAMINATION, REVIEW OF SYSTEMS, AND OTHER HISTORY.    Assessment/Plan: 60 year old C5-6 quadriplegic male with chronic rectal  pain.  This has been getting progressively worse.      On exam today, he had a fairly tight anal sphincter and with digital rectal exam he passed a large amount of flatus and his rectum felt quite distended.      I wonder if his rectal pain he is due to gas in his rectum that he is not able to pass.  He reports that he passes gas in the morning and at night when he is laying down but typically not during the day.  Discussed trying to get him on his side several times during the day with digitizing his rectum to try to help facilitate passing gas to see if this improves his rectal pain.      We also discussed using a muscle relaxer, although I am not sure that this would be particularly beneficial in relaxing his sphincter.      He could also try doing enemas more frequently to keep his bowels cleared out to see if this improves things.      He did have some improvement when he was treated for prostatitis so I would like him to follow-up with urology to ensure that he does not have some chronic prostatitis that is manifesting as rectal pain.      I would also, again, recommend a colonoscopy.  He has been having quite a bit of heartburn and chest pain and will be meeting with gastroenterology at the end of June to discuss possible upper endoscopy.  These could be done at the same time.      We briefly discussed that if his rectal pain is severe and seems to be related to inability to pass gas, that an ostomy could be a consideration in the future. Patient's questions were answered to his stated satisfaction and he is in agreement with this plan.    40 minutes spent on the date of the encounter doing chart review, history and exam, documentation and further activities as noted above.     Nelia Bee MD  Colon and Rectal Surgery Staff  [facility]  "Center      -------------------------------------------------------------------------------------------------------------------          Medical history:  Past Medical History:   Diagnosis Date     History of spinal cord injury 06/28/1980     Neuromuscular disorder (H)      Unspecified site of spinal cord injury without evidence of spinal bone injury     C 5-6       Surgical history:  Past Surgical History:   Procedure Laterality Date     BACK SURGERY       GENITOURINARY SURGERY       SOFT TISSUE SURGERY         Problem list:    Patient Active Problem List    Diagnosis Date Noted     Autonomic dysfunction/dysregulation, possibly dysreflexia 11/11/2013     Priority: Medium     Tetraplegia (H) 11/11/2013     Priority: Medium     Neurogenic bladder 10/08/2012     Priority: Medium     Problem list name updated by automated process. Provider to review       UTI (urinary tract infection) 08/02/2012     Priority: Medium     Shoulder pain 10/12/2010     Priority: Medium     Impingement syndrome, shoulder 10/12/2010     Priority: Medium       Medications:  Current Outpatient Medications   Medication Sig Dispense Refill     COMPRESSION STOCKINGS 1 each daily Compression stockings, knee high to be worn during days, ok off at night. Medium 20-30 mmHg compression. 3 pair. 6 refills. 3 each 6     glycopyrrolate (ROBINUL) 1 MG tablet Take 1 mg by mouth 4 times daily       multivitamin, therapeutic with minerals (THERA-VIT-M) TABS Take 1 tablet by mouth daily.         polyethylene glycol (MIRALAX) packet Take 238 g by mouth See Admin Instructions One 8.3-ounce bottle (238 g).  Refer to \"Getting Ready for a Colonoscopy\" patient handout 238 g 0     psyllium (METAMUCIL) 58.6 % packet Take 1 packet by mouth 2 times daily.         sulfamethoxazole-trimethoprim (BACTRIM/SEPTRA) 400-80 MG tablet Take 1 tablet by mouth daily 90 tablet 3     TERAZOSIN HCL PO Take 5 mg by mouth daily.       acidophilus (BACID) 10 MG CAPS Take 1 capsule by " "mouth 2 times daily.         bisacodyl (DULCOLAX) 5 MG EC tablet Take 1 tablet (5 mg) by mouth See Admin Instructions Refer to \"Getting Ready for a Colonoscopy\" patient handout 2 tablet 0     fluticasone (FLONASE) 50 MCG/ACT nasal spray Spray 2 sprays into both nostrils daily.         ketorolac (TORADOL) 10 MG tablet Take 1 tablet (10 mg) by mouth every 6 hours as needed for moderate pain (Patient not taking: Reported on 11/9/2020) 10 tablet 0     magnesium citrate solution Take 296 mLs by mouth See Admin Instructions Refer to \"Getting Ready for a Colonoscopy\" patient handout. 296 mL 0     Nitrofurantoin Macrocrystal (MACRODANTIN PO) Take 100 mg in the morning and 200 mg in the evening.       polyethylene glycol (MIRALAX/GLYCOLAX) packet Take 1 packet by mouth 2 times daily.           Allergies:  Allergies   Allergen Reactions     Augmentin Nausea and Vomiting       Family history:  Family History   Problem Relation Age of Onset     Colon Polyps Father      Sleep Apnea Father      Ulcerative Colitis Paternal Grandfather         and paternal grandmother     Sleep Apnea Sister      Anesthesia Reaction No family hx of      Crohn's Disease No family hx of      Cancer No family hx of        Social history:  Social History     Socioeconomic History     Marital status: Single     Spouse name: Not on file     Number of children: Not on file     Years of education: Not on file     Highest education level: Not on file   Occupational History     Not on file   Social Needs     Financial resource strain: Not on file     Food insecurity     Worry: Not on file     Inability: Not on file     Transportation needs     Medical: Not on file     Non-medical: Not on file   Tobacco Use     Smoking status: Never Smoker     Smokeless tobacco: Never Used   Substance and Sexual Activity     Alcohol use: Yes     Drug use: No     Sexual activity: Not Currently   Lifestyle     Physical activity     Days per week: Not on file     Minutes per " session: Not on file     Stress: Not on file   Relationships     Social connections     Talks on phone: Not on file     Gets together: Not on file     Attends Buddhist service: Not on file     Active member of club or organization: Not on file     Attends meetings of clubs or organizations: Not on file     Relationship status: Not on file     Intimate partner violence     Fear of current or ex partner: Not on file     Emotionally abused: Not on file     Physically abused: Not on file     Forced sexual activity: Not on file   Other Topics Concern     Parent/sibling w/ CABG, MI or angioplasty before 65F 55M? No   Social History Narrative     Not on file         Nursing Notes:   Lise Urbina  5/12/2021  4:17 PM  Signed  Chief Complaint   Patient presents with     New Patient     external hemorrhoids and pain        Vitals:    05/12/21 1614   BP: 94/65   BP Location: Right arm   Patient Position: Sitting   Cuff Size: Adult Regular   Pulse: 74   SpO2: 100%       There is no height or weight on file to calculate BMI.    Lise Urbina MA         Physical Examination:  BP 94/65 (BP Location: Right arm, Patient Position: Sitting, Cuff Size: Adult Regular)   Pulse 74   SpO2 100%   General: Pleasant, no acute distress. Needs significant assistance with transfers with quad status.  Abdomen: moderately distended.   Perianal external examination: Significant perineal dissent. Skin intact.  Digital rectal examination: Was performed.   Sphincter tone: very tight but not stenostic. Palpable lesions: No.  Significant gas out with digital rectal examination around index finger    Anoscopy: Was performed.   Hemorrhoids: No significant internal hemorrhoids.  Lesions: No.  Again, significant gas around this.    Again, thank you for allowing me to participate in the care of your patient.      Sincerely,    Nelia Bee MD

## 2021-05-12 NOTE — NURSING NOTE
Chief Complaint   Patient presents with     New Patient     external hemorrhoids and pain        Vitals:    05/12/21 1614   BP: 94/65   BP Location: Right arm   Patient Position: Sitting   Cuff Size: Adult Regular   Pulse: 74   SpO2: 100%       There is no height or weight on file to calculate BMI.    Lise Urbina MA

## 2021-05-18 ENCOUNTER — TRANSFERRED RECORDS (OUTPATIENT)
Dept: HEALTH INFORMATION MANAGEMENT | Facility: CLINIC | Age: 61
End: 2021-05-18

## 2021-05-18 ENCOUNTER — MEDICAL CORRESPONDENCE (OUTPATIENT)
Dept: HEALTH INFORMATION MANAGEMENT | Facility: CLINIC | Age: 61
End: 2021-05-18

## 2021-05-20 ENCOUNTER — TRANSCRIBE ORDERS (OUTPATIENT)
Dept: OTHER | Age: 61
End: 2021-05-20

## 2021-05-20 DIAGNOSIS — K21.00 GASTROESOPHAGEAL REFLUX DISEASE WITH ESOPHAGITIS, UNSPECIFIED WHETHER HEMORRHAGE: Primary | ICD-10-CM

## 2021-05-20 DIAGNOSIS — R63.4 WEIGHT LOSS: ICD-10-CM

## 2021-05-21 NOTE — TELEPHONE ENCOUNTER
REFERRAL INFORMATION:    Referring Provider:  Dr. Karina Sr    Referring Clinic:  [facility]     Reason for Visit/Diagnosis: GERD     FUTURE VISIT INFORMATION:    Appointment Date: 6/22/2021    Appointment Time: 2 PM      NOTES STATUS DETAILS   OFFICE NOTE from Referring Provider Received 5/18/2021 Office visit with Dr. Sr     OFFICE NOTE from Other Specialist Internal 5/12/2021 Office visit with Dr. Nelia Bee ([facility] Colon Rectal)    HOSPITAL DISCHARGE SUMMARY/  ED VISITS N/A    OPERATIVE REPORT N/A    MEDICATION LIST Internal         ENDOSCOPY  N/A    COLONOSCOPY N/A    ERCP N/A    EUS N/A    STOOL TESTING N/A    PERTINENT LABS Internal    PATHOLOGY REPORTS (RELATED) N/A    IMAGING (CT, MRI, EGD, MRCP, Small Bowel Follow Through/SBT, MR/CT Enterography) N/A

## 2021-05-25 ENCOUNTER — TRANSCRIBE ORDERS (OUTPATIENT)
Dept: OTHER | Age: 61
End: 2021-05-25

## 2021-05-25 DIAGNOSIS — Z12.11 SCREEN FOR COLON CANCER: Primary | ICD-10-CM

## 2021-05-26 ENCOUNTER — TRANSCRIBE ORDERS (OUTPATIENT)
Dept: OTHER | Age: 61
End: 2021-05-26

## 2021-05-26 DIAGNOSIS — Z11.59 ENCOUNTER FOR SCREENING FOR OTHER VIRAL DISEASES: ICD-10-CM

## 2021-05-26 DIAGNOSIS — Z12.11 SCREEN FOR COLON CANCER: Primary | ICD-10-CM

## 2021-06-01 ENCOUNTER — TELEPHONE (OUTPATIENT)
Dept: GASTROENTEROLOGY | Facility: CLINIC | Age: 61
End: 2021-06-01

## 2021-06-01 NOTE — TELEPHONE ENCOUNTER
Attempted to contact patient regarding upcoming colonoscopy/EGD procedure on 6/11/21 for pre assessment questions. No answer.     Left message to return call to [phone] #2    Covid test scheduled 6/8/21    Roslyn Mendez RN

## 2021-06-01 NOTE — TELEPHONE ENCOUNTER
"Attempted to go through pre-assessment questions with pt prior to upcoming colonoscopy and EGD on 6.11.2021.    COVID test 6.8.2021.  Time, location, and date of procedure reviewed with pt.    Designated  policy reviewed.      Tressa Ruth RN please review and advise:  Pt is concerned as he states he is a quadriplegic and has heard from another person that some physicians are better suited for quadriplegic pts due to their low motility and tighter turns in the colon.  Pt would like to ensure that he is scheduled appropriately.    Pt is also concerned with the prep for this procedure.  RN was reviewing chart and would have ordered golytely d/t pt taking miralax daily and utilizing bisacodyl suppositories.  However pt states that he has already picked up everything that is needed for the miralax/magnesium citrate prep.  Pt is concerned with taking the prep in two large doses as pt states that he will just be \"laying in a mess.\"  RN acknowledges patient's concern; however the goal is also to ensure pt is cleaned out prior to procedure.  Pt is requesting if he can spread the prep out into 2 days.      RN did speak with pt that it is important that pt is \"cleaned\" out.  Pt asked if the scope can clean/remove things in the colon.  RN informed pt that the scope can do minimal cleaning with water however the suction is not very big in diameter therefore it cannot remove any formed stool.  RN clarified with pt that is also why we have pt stop eating nuts, seeds, and popcorn leading up to the procedure as this can clog the scope.     RN also advised that pt should bring an extra pair of clothes to have on hand as it is common for pt's to still be having bowel movements leading up to their procedure.  Pt agreed that bringing an extra pair of clothing would be a good idea.    Pt is planning on utilizing a medical cab for transportation.    Rachel Mcgregor RN        "

## 2021-06-04 ENCOUNTER — MYC MEDICAL ADVICE (OUTPATIENT)
Dept: SURGERY | Facility: AMBULATORY SURGERY CENTER | Age: 61
End: 2021-06-04

## 2021-06-04 NOTE — TELEPHONE ENCOUNTER
Pt was informed that he is scheduled appropriately.    RN is still working with the GI robin re: pt's prep concerns.  Pt called stating he has an alternative prep idea and would like to submit it via [employer].  Per pt's request RN sent pt a [employer] message where he states he will respond.    Pt has no further questions or concerns.      Rachel Mcgregor RN

## 2021-06-08 DIAGNOSIS — Z11.59 ENCOUNTER FOR SCREENING FOR OTHER VIRAL DISEASES: ICD-10-CM

## 2021-06-08 LAB
SARS-COV-2 RNA RESP QL NAA+PROBE: NORMAL
SPECIMEN SOURCE: NORMAL

## 2021-06-08 PROCEDURE — U0005 INFEC AGEN DETEC AMPLI PROBE: HCPCS | Performed by: INTERNAL MEDICINE

## 2021-06-08 PROCEDURE — U0003 INFECTIOUS AGENT DETECTION BY NUCLEIC ACID (DNA OR RNA); SEVERE ACUTE RESPIRATORY SYNDROME CORONAVIRUS 2 (SARS-COV-2) (CORONAVIRUS DISEASE [COVID-19]), AMPLIFIED PROBE TECHNIQUE, MAKING USE OF HIGH THROUGHPUT TECHNOLOGIES AS DESCRIBED BY CMS-2020-01-R: HCPCS | Performed by: INTERNAL MEDICINE

## 2021-06-08 NOTE — TELEPHONE ENCOUNTER
"Pt's bowel prep plan that was sent to Dr. Adame to reviews:   \"june 04 = no metamucil, no nuts, no seeds, no corn, no multi-vitamin with iron  june 08 = start low fiber, no whole wheat, no raw or cooked vegetables, no blueberries, no fig bars                     noon-6pm = start drinking 32-oz of gatorade daily  june 09 = 6-10pm = drink 32-oz of gatorade with 6?-capfuls of mirilax  alpa 10 = start no solid foods, liquid broths or jello only                     6-10pm = drink 32-oz of gatorade with 6?-capfuls of mirilax                     6pm = take 2? dulcolax tablets                    10pm = no food after 10pm                    10pm = drink 10oz of magnesium citrate, take 2? dulcolax tablets  june 11 = 7am = start bowel program, take regular 10mg ducolax suppository                     appointment 1:30pm, bring extra pair of pants\"      Per Dr. Adame:  \"Whatever the prep, the stool just needs to clear yellow (like mountain dew) with no sediment or solids in it.\"    Informed pt of Dr. Adame message above.  RN answered pt's questions regarding bowel prep.  Pt will now take the full 7 capfuls of miralax with each split dose of miralax/gatorade prep.  Pt is aware that bowel results need to be clear yellow.  Informed pt that if bowel prep is liquid brown or formed after bowel prep pt will need to reschedule procedure.    Pt has no further questions or concerns.      Rachel Mcgregor RN        "

## 2021-06-09 LAB
LABORATORY COMMENT REPORT: NORMAL
SARS-COV-2 RNA RESP QL NAA+PROBE: NEGATIVE
SPECIMEN SOURCE: NORMAL

## 2021-06-11 ENCOUNTER — HOSPITAL ENCOUNTER (OUTPATIENT)
Facility: CLINIC | Age: 61
Discharge: HOME | End: 2021-06-11
Attending: INTERNAL MEDICINE | Admitting: INTERNAL MEDICINE
Payer: MEDICARE

## 2021-06-11 VITALS
WEIGHT: 140 LBS | DIASTOLIC BLOOD PRESSURE: 44 MMHG | SYSTOLIC BLOOD PRESSURE: 69 MMHG | OXYGEN SATURATION: 98 % | HEIGHT: 70 IN | HEART RATE: 75 BPM | BODY MASS INDEX: 20.04 KG/M2 | RESPIRATION RATE: 12 BRPM | TEMPERATURE: 97.5 F

## 2021-06-11 LAB
COLONOSCOPY: NORMAL
GLUCOSE BLDC GLUCOMTR-MCNC: 65 MG/DL (ref 70–99)
UPPER GI ENDOSCOPY: NORMAL

## 2021-06-11 PROCEDURE — 88305 TISSUE EXAM BY PATHOLOGIST: CPT | Mod: TC | Performed by: INTERNAL MEDICINE

## 2021-06-11 PROCEDURE — G0500 MOD SEDAT ENDO SERVICE >5YRS: HCPCS | Performed by: INTERNAL MEDICINE

## 2021-06-11 PROCEDURE — 258N000003 HC RX IP 258 OP 636: Performed by: INTERNAL MEDICINE

## 2021-06-11 PROCEDURE — 45385 COLONOSCOPY W/LESION REMOVAL: CPT | Mod: PT | Performed by: INTERNAL MEDICINE

## 2021-06-11 PROCEDURE — 99153 MOD SED SAME PHYS/QHP EA: CPT | Performed by: INTERNAL MEDICINE

## 2021-06-11 PROCEDURE — 250N000011 HC RX IP 250 OP 636: Performed by: INTERNAL MEDICINE

## 2021-06-11 PROCEDURE — 45378 DIAGNOSTIC COLONOSCOPY: CPT | Performed by: INTERNAL MEDICINE

## 2021-06-11 PROCEDURE — 88305 TISSUE EXAM BY PATHOLOGIST: CPT | Mod: 26 | Performed by: PATHOLOGY

## 2021-06-11 PROCEDURE — 82962 GLUCOSE BLOOD TEST: CPT | Mod: GZ

## 2021-06-11 PROCEDURE — 43239 EGD BIOPSY SINGLE/MULTIPLE: CPT | Performed by: INTERNAL MEDICINE

## 2021-06-11 RX ORDER — NALOXONE HYDROCHLORIDE 0.4 MG/ML
0.2 INJECTION, SOLUTION INTRAMUSCULAR; INTRAVENOUS; SUBCUTANEOUS
Status: DISCONTINUED | OUTPATIENT
Start: 2021-06-11 | End: 2021-06-11 | Stop reason: HOSPADM

## 2021-06-11 RX ORDER — ONDANSETRON 2 MG/ML
4 INJECTION INTRAMUSCULAR; INTRAVENOUS EVERY 6 HOURS PRN
Status: DISCONTINUED | OUTPATIENT
Start: 2021-06-11 | End: 2021-06-11 | Stop reason: HOSPADM

## 2021-06-11 RX ORDER — FLUMAZENIL 0.1 MG/ML
0.2 INJECTION, SOLUTION INTRAVENOUS
Status: DISCONTINUED | OUTPATIENT
Start: 2021-06-11 | End: 2021-06-11 | Stop reason: HOSPADM

## 2021-06-11 RX ORDER — NALOXONE HYDROCHLORIDE 0.4 MG/ML
0.4 INJECTION, SOLUTION INTRAMUSCULAR; INTRAVENOUS; SUBCUTANEOUS
Status: DISCONTINUED | OUTPATIENT
Start: 2021-06-11 | End: 2021-06-11 | Stop reason: HOSPADM

## 2021-06-11 RX ORDER — ONDANSETRON 2 MG/ML
4 INJECTION INTRAMUSCULAR; INTRAVENOUS
Status: DISCONTINUED | OUTPATIENT
Start: 2021-06-11 | End: 2021-06-11 | Stop reason: HOSPADM

## 2021-06-11 RX ORDER — FENTANYL CITRATE 50 UG/ML
INJECTION, SOLUTION INTRAMUSCULAR; INTRAVENOUS PRN
Status: DISCONTINUED | OUTPATIENT
Start: 2021-06-11 | End: 2021-06-11 | Stop reason: HOSPADM

## 2021-06-11 RX ORDER — PROCHLORPERAZINE MALEATE 10 MG
10 TABLET ORAL EVERY 6 HOURS PRN
Status: DISCONTINUED | OUTPATIENT
Start: 2021-06-11 | End: 2021-06-11 | Stop reason: HOSPADM

## 2021-06-11 RX ORDER — ONDANSETRON 4 MG/1
4 TABLET, ORALLY DISINTEGRATING ORAL EVERY 6 HOURS PRN
Status: DISCONTINUED | OUTPATIENT
Start: 2021-06-11 | End: 2021-06-11 | Stop reason: HOSPADM

## 2021-06-11 RX ORDER — LIDOCAINE 40 MG/G
CREAM TOPICAL
Status: DISCONTINUED | OUTPATIENT
Start: 2021-06-11 | End: 2021-06-11 | Stop reason: HOSPADM

## 2021-06-11 RX ADMIN — MIDAZOLAM 1 MG: 1 INJECTION INTRAMUSCULAR; INTRAVENOUS at 15:23

## 2021-06-11 RX ADMIN — FENTANYL CITRATE 50 MCG: 50 INJECTION, SOLUTION INTRAMUSCULAR; INTRAVENOUS at 15:11

## 2021-06-11 RX ADMIN — MIDAZOLAM 1 MG: 1 INJECTION INTRAMUSCULAR; INTRAVENOUS at 15:16

## 2021-06-11 RX ADMIN — MIDAZOLAM 2 MG: 1 INJECTION INTRAMUSCULAR; INTRAVENOUS at 15:11

## 2021-06-11 RX ADMIN — SODIUM CHLORIDE, POTASSIUM CHLORIDE, SODIUM LACTATE AND CALCIUM CHLORIDE 500 ML: 600; 310; 30; 20 INJECTION, SOLUTION INTRAVENOUS at 15:18

## 2021-06-11 RX ADMIN — FENTANYL CITRATE 25 MCG: 50 INJECTION, SOLUTION INTRAMUSCULAR; INTRAVENOUS at 15:23

## 2021-06-11 RX ADMIN — FENTANYL CITRATE 50 MCG: 50 INJECTION, SOLUTION INTRAMUSCULAR; INTRAVENOUS at 15:16

## 2021-06-11 RX ADMIN — MIDAZOLAM 1 MG: 1 INJECTION INTRAMUSCULAR; INTRAVENOUS at 15:20

## 2021-06-11 ASSESSMENT — MIFFLIN-ST. JEOR: SCORE: 1456.29

## 2021-06-11 NOTE — OR NURSING
Paged  [phone]: Pt Nicholas BP low MAP 58 66/44 pt says his bp normally low.  Please advise.  *[phone]: RN spoke with  pt to drink another glass of water and is ok to discharge.

## 2021-06-11 NOTE — DISCHARGE INSTRUCTIONS
Discharge Instructions after  Upper Endoscopy (EGD)    Activity and Diet  You were given medicine for pain. You may be dizzy or sleepy.  For 24 hours:    Do not drive or use heavy equipment.    Do not make important decisions.    Do not drink any alcohol.  ___ You may return to your regular diet.    Discomfort  You may have a sore throat for 2 to 3 days. It may help to:    Avoid hot liquids for 24 hours.    Use sore throat lozenges.    Gargle as needed with salt water up to 4 times a day. Mix 1 cup of warm water  with 1 teaspoon of salt. Do not swallow.  ___ Your esophagus was dilated (opened) or banded during the exam:    Drink only cool liquids for the rest of the day. Eat a soft diet for the next few days.    You may have a sore chest for 2 to 3 days.    You may take Tylenol (acetaminophen) for pain unless your doctor has told you not to.    Do not take aspirin or ibuprofen (Advil, Motrin) or other NSAIDS  (anti-inflammatory drugs) for ___ days.    Follow-up  ___ We took small tissue samples for study. If you do not have a follow-up visit scheduled,  call your provider s office in 2 weeks for the results.    Other instructions________________________________________________________    When to call us:  Problems are rare. Call right away if you have:    Unusual throat pain or trouble swallowing    Unusual pain in belly or chest that is not relieved by belching or passing air    Black stools (tar-like looking bowel movement)    Temperature above 100.6  F. (37.5  C).    If you vomit blood or have severe pain, go to an emergency room.    If you have questions, call:  Monday to Friday, 8 a.m. to 4:30 p.m.: Endoscopy: [phone] (We may have to call you back)    After hours: Hospital: [phone] (Ask for the GI fellow on call)

## 2021-06-11 NOTE — OR NURSING
Patients supervising person at home following procedure is her personal live in care attendant, Elvin Mancuso, report given to Endo RN concerning patients quadriplegic status and low bp.

## 2021-06-14 LAB — COPATH REPORT: NORMAL

## 2021-06-21 ENCOUNTER — TELEPHONE (OUTPATIENT)
Dept: UROLOGY | Facility: CLINIC | Age: 61
End: 2021-06-21

## 2021-06-21 DIAGNOSIS — N31.9 NEUROGENIC BLADDER: Primary | ICD-10-CM

## 2021-06-21 RX ORDER — TAMSULOSIN HYDROCHLORIDE 0.4 MG/1
0.4 CAPSULE ORAL DAILY
Qty: 30 CAPSULE | Refills: 0 | Status: SHIPPED | OUTPATIENT
Start: 2021-06-21

## 2021-06-21 NOTE — TELEPHONE ENCOUNTER
Patient called nurse line and LM. Returned phone call and spoke with patient. He would like to know if Hyrtin could be replaced with Flomax with his neurogenic bladder. Spoke with MD and he stated that if patient was not taking the Hytrin to help with Blood pressure, than Flomax would be a consideration. Per patient returned phone call and he informed me his BP is actually on the low side. Will speak again with MD.     Jaclyn Panchal LPN

## 2021-06-22 ENCOUNTER — MEDICAL CORRESPONDENCE (OUTPATIENT)
Dept: HEALTH INFORMATION MANAGEMENT | Facility: CLINIC | Age: 61
End: 2021-06-22

## 2021-06-22 ENCOUNTER — TRANSFERRED RECORDS (OUTPATIENT)
Dept: HEALTH INFORMATION MANAGEMENT | Facility: CLINIC | Age: 61
End: 2021-06-22

## 2021-06-22 ENCOUNTER — VIRTUAL VISIT (OUTPATIENT)
Dept: GASTROENTEROLOGY | Facility: CLINIC | Age: 61
End: 2021-06-22
Payer: MEDICARE

## 2021-06-22 ENCOUNTER — PRE VISIT (OUTPATIENT)
Dept: GASTROENTEROLOGY | Facility: CLINIC | Age: 61
End: 2021-06-22

## 2021-06-22 VITALS — HEIGHT: 70 IN | BODY MASS INDEX: 20.04 KG/M2 | WEIGHT: 140 LBS

## 2021-06-22 DIAGNOSIS — K59.2 NEUROGENIC BOWEL: ICD-10-CM

## 2021-06-22 DIAGNOSIS — K21.9 GASTROESOPHAGEAL REFLUX DISEASE WITHOUT ESOPHAGITIS: ICD-10-CM

## 2021-06-22 DIAGNOSIS — R14.0 ABDOMINAL BLOATING: Primary | ICD-10-CM

## 2021-06-22 PROCEDURE — 99204 OFFICE O/P NEW MOD 45 MIN: CPT | Mod: 95 | Performed by: PHYSICIAN ASSISTANT

## 2021-06-22 RX ORDER — SIMETHICONE 125 MG
125 TABLET,CHEWABLE ORAL 4 TIMES DAILY PRN
Qty: 120 TABLET | Refills: 11 | Status: SHIPPED | OUTPATIENT
Start: 2021-06-22 | End: 2024-01-10

## 2021-06-22 ASSESSMENT — PAIN SCALES - GENERAL: PAINLEVEL: NO PAIN (0)

## 2021-06-22 ASSESSMENT — MIFFLIN-ST. JEOR: SCORE: 1456.29

## 2021-06-22 NOTE — PROGRESS NOTES
Joao is a 60 year old who is being evaluated via a billable video visit.      How would you like to obtain your AVS? MyChart  If the video visit is dropped, the invitation should be resent by: Text to cell phone: [phone]  Will anyone else be joining your video visit? No      Video Start Time: 2:01 PM  Video-Visit Details    Type of service:  Video Visit    Video End Time: 2:37 PM    Originating Location (pt. Location): Home    Distant Location (provider location):  [facility] [place]     Platform used for Video Visit: Well    GI CLINIC VISIT    CC/REFERRING MD:  Referred Self  REASON FOR CONSULTATION: GERD    ASSESSMENT/PLAN:  Alex Rodríguez is a 60 year C5-6 quadriplegic male, with a history of neurogenic bowel, hemorrhoids, abdominal pain.     1.  Abdominal pain  Patient reports about a year and a half of burning abdominal pain.  Was started on PPI which did improve his symptoms, dose was increased to 40 mg and when symptoms improved he was tapered off this medication.  Now continues on H2 blocker.  Recent EGD without evidence of complications of reflux or H. pylori.  Symptoms may be due to dyspepsia, functional dyspepsia, esophageal hypersensitivity.  Discussed he can continue H2 blocker at this time.  Would recommend lifestyle modifications for GERD including trying to eat smaller more frequent meals throughout the day.  If symptoms worsen, could plan for short course of PPI with 40 mg a day.  We will also plan to treat constipation as outlined below which may be contributing to burning abdominal pain.  --Try to have small frequent meals throughout the day.  Avoid eating before laying down  --Okay to take famotidine as needed  --Let us know if the burning pain worsens, we can always do a short course of proton pump inhibitor    2.  Constipation, neurogenic bowel  Patient has longstanding history of constipation and neurogenic bowel, also has worsened recent rectal  pain.  Per Dr. Bee, he had significant amount of gas on rectal exam which could be contributing to the pain.  Of note he does have an improvement with Fleet enema every 6 days.  Discussed plan for him to increase frequency of Fleet enema to more adequately empty his bowel.  Should continue MiraLAX and Metamucil, and bisacodyl as he has been.  He does find Gas-X helpful, he can take this more regularly.  Pending symptoms, may need to consider colectomy.  --Take Gas-X as needed, it is okay to take this more often  --I would recommend increasing frequency of Fleet enemas.  Start with taking this every 4 days, if this is helpful but you continue to have rectal pain you can take this with the rest of your bowel regimen every 2 days    Colorectal cancer screening: Given prep of previous colonoscopy, Dr. Adame recommended repeat screening colonoscopy with increased prep in 3 to 6 months    RTC as needed    Thank you for this consultation.  It was a pleasure to participate in the care of this patient; please contact us with any further questions.     55 Minutes was spent on the date of the encounter during chart review, history and exam, documentation, and further activities as noted     Note completed using voice recognition software. Some word and grammatical errors may occur.      Xenia Cox PA-C  Division of Gastroenterology, Hepatology & Nutrition  [facility]        HPI  Alex Rodríguez is a 60 year C5-6 quadriplegic male, with a history of neurogenic bowel, hemorrhoids, abdominal pain.  He is new to the [facility] clinic and this is my first encounter with the patient.    The patient reports in 1989 he had severe GI symptoms, he had a scope at the time without source of symptoms.     Patient reports for the last year he has felt abdominal and stomach burning. He believes this started about 6 months before COVID.  He started omeprazole 20 mg daily, this was then  "increased up to 40 mg for 5 months.  He then started weaning down with 20 mg, then 10 mg.  He has been totally off of PPI for 8 days.  He states that with time, his symptoms slowly improved, and he now has minor symptoms.  He continues to take H2 blocker with famotidine.  Of note, patient recently underwent upper endoscopy without H. pylori, ulcer, esophagitis.    Does report increased gas. This builds up throughout the day. He does take gas-ex when symptoms are severe.    The patient reports that he has a hoarse voice, this started several months ago.  Was recommended to see ENT.    For his bowel regimen, currently doing 10 mg Dulcolax suppostitory, 4-5 digitial stimulations every 48 hours. Fleet enemas- averaging now every 6 six days. This does seem to reduce the rectal pain. Pain is always there to some extent, it is tolerable. Severity improved from 7- 3. Will have three to four fists of stool when this happens. 1.5 tablespoons of metamucil in the morning, another in the evening. He also takes 8.6 mg of miralax. When he increases Miralax, his stools get runny. Consistency is soft, comes out like \"waffle batter\".     Diet recall:   Lunch:   Oatmeal for dinner   Cannot sit long enough to eat three meals     He was seen by Dr. Bee 5/12/2021 reporting constant rectal pain.  At the time he reported this slightly improved with antibiotics.  On exam, he the patient was found to have a fairly tight anal sphincter and he passed a large amount of gas with digital rectal exam.  He was recommended to try digitizing his rectum to help facilitate passing of gas, more frequent enemas, consideration of muscle relaxer and discussion with urology given that his symptoms are improved with treatment for prostatitis.  An ostomy was also mentioned as a potential option.     PROBLEM LIST  Patient Active Problem List    Diagnosis Date Noted     Autonomic dysfunction/dysregulation, possibly dysreflexia 11/11/2013     Priority: " Medium     Tetraplegia (H) 11/11/2013     Priority: Medium     Neurogenic bladder 10/08/2012     Priority: Medium     Problem list name updated by automated process. Provider to review       UTI (urinary tract infection) 08/02/2012     Priority: Medium     Shoulder pain 10/12/2010     Priority: Medium     Impingement syndrome, shoulder 10/12/2010     Priority: Medium       PERTINENT PAST MEDICAL HISTORY:  Past Medical History:   Diagnosis Date     History of spinal cord injury 06/28/1980     Neuromuscular disorder (H)      Unspecified site of spinal cord injury without evidence of spinal bone injury     C 5-6       PREVIOUS SURGERIES:  Past Surgical History:   Procedure Laterality Date     BACK SURGERY       COLONOSCOPY N/A 6/11/2021    Procedure: COLONOSCOPY;  Surgeon: Jay Adame MD;  Location:  GI     ESOPHAGOSCOPY, GASTROSCOPY, DUODENOSCOPY (EGD), COMBINED N/A 6/11/2021    Procedure: ESOPHAGOGASTRODUODENOSCOPY, WITH BIOPSY;  Surgeon: Jay Adame MD;  Location:  GI     GENITOURINARY SURGERY       SOFT TISSUE SURGERY         PREVIOUS ENDOSCOPY:  Impression:          No findings of GERD complications. Biopsies taken to                        rule out H pylori. Two gastric polyps were biopsied-                        likely fundic gland polyps.                        - Z-line regular, 47 cm from the incisors.                        - Normal esophagus.                        - Normal antrum and prepyloric region of the stomach.                        Biopsied.                        - Two gastric polyps. Biopsied.                        - Normal duodenal bulb and second portion of the                        duodenum.                        - The examination was otherwise normal.   Impression:          One polyp resected. An additional polyp was visualized                        in the ascending colon but unfortunately lost and unable                        to be visualized again despite  extensive searching.                        - Hemorrhoids found on perianal exam.                        - One 3 mm polyp in the ascending colon. Resection not                        attempted.                        - One 5 mm polyp in the rectum, removed with a cold                        snare. Resected and retrieved.                        - Stool in the entire examined colon.                        - The examination was otherwise normal on direct and                        retroflexion views.   Recommendation:      - Discharge patient to home (with escort).                        - Resume previous diet.                        - Continue present medications.                        - Await pathology results.                        - Repeat colonoscopy in next 3-6 months with double prep.     FINAL DIAGNOSIS:   A. GASTRIC BIOPSY:   - Gastric antral and fundic-type mucosa with no significant histologic   abnormality   - Negative for intestinal metaplasia and dysplasia   - No H. pylori organisms identified on routine stain     B. GASTRIC POLYP, BODY, POLYPECTOMY:   - Gastric fundic polyp   - Negative for intestinal metaplasia and dysplasia   - No H. Pylori-like organisms identified on routine stain     C. RECTAL POLYP, POLYPECTOMY:   - Benign squamous lined polyp compatible with fibroepithelial polyp   - Negative for dysplasia and malignancy   - Vascular congestion and hemorrhage     ALLERGIES:     Allergies   Allergen Reactions     Augmentin Nausea and Vomiting       PERTINENT MEDICATIONS:    Current Outpatient Medications:      COMPRESSION STOCKINGS, 1 each daily Compression stockings, knee high to be worn during days, ok off at night. Medium 20-30 mmHg compression. 3 pair. 6 refills., Disp: 3 each, Rfl: 6     glycopyrrolate (ROBINUL) 1 MG tablet, Take 1 mg by mouth 4 times daily, Disp: , Rfl:      multivitamin, therapeutic with minerals (THERA-VIT-M) TABS, Take 1 tablet by mouth daily.  , Disp: , Rfl:       "omeprazole (PRILOSEC) 20 MG DR capsule, Take 20 mg by mouth daily, Disp: , Rfl:      psyllium (METAMUCIL) 58.6 % packet, Take 1 packet by mouth 2 times daily.  , Disp: , Rfl:      sulfamethoxazole-trimethoprim (BACTRIM/SEPTRA) 400-80 MG tablet, Take 1 tablet by mouth daily, Disp: 90 tablet, Rfl: 3     tamsulosin (FLOMAX) 0.4 MG capsule, Take 1 capsule (0.4 mg) by mouth daily, Disp: 30 capsule, Rfl: 0     TERAZOSIN HCL PO, Take 5 mg by mouth daily., Disp: , Rfl:      acidophilus (BACID) 10 MG CAPS, Take 1 capsule by mouth 2 times daily.  , Disp: , Rfl:      bisacodyl (DULCOLAX) 5 MG EC tablet, Take 1 tablet (5 mg) by mouth See Admin Instructions Refer to \"Getting Ready for a Colonoscopy\" patient handout, Disp: 2 tablet, Rfl: 0     fluticasone (FLONASE) 50 MCG/ACT nasal spray, Spray 2 sprays into both nostrils daily.  , Disp: , Rfl:      ketorolac (TORADOL) 10 MG tablet, Take 1 tablet (10 mg) by mouth every 6 hours as needed for moderate pain (Patient not taking: Reported on 11/9/2020), Disp: 10 tablet, Rfl: 0     magnesium citrate solution, Take 296 mLs by mouth See Admin Instructions Refer to \"Getting Ready for a Colonoscopy\" patient handout., Disp: 296 mL, Rfl: 0     Nitrofurantoin Macrocrystal (MACRODANTIN PO), Take 100 mg in the morning and 200 mg in the evening., Disp: , Rfl:      polyethylene glycol (MIRALAX) packet, Take 238 g by mouth See Admin Instructions One 8.3-ounce bottle (238 g).  Refer to \"Getting Ready for a Colonoscopy\" patient handout, Disp: 238 g, Rfl: 0     polyethylene glycol (MIRALAX/GLYCOLAX) packet, Take 1 packet by mouth 2 times daily.  , Disp: , Rfl:     SOCIAL HISTORY:  Social History     Socioeconomic History     Marital status: Single     Spouse name: Not on file     Number of children: Not on file     Years of education: Not on file     Highest education level: Not on file   Occupational History     Not on file   Social Needs     Financial resource strain: Not on file     Food " "insecurity     Worry: Not on file     Inability: Not on file     Transportation needs     Medical: Not on file     Non-medical: Not on file   Tobacco Use     Smoking status: Never Smoker     Smokeless tobacco: Never Used   Substance and Sexual Activity     Alcohol use: Yes     Drug use: No     Sexual activity: Not Currently   Lifestyle     Physical activity     Days per week: Not on file     Minutes per session: Not on file     Stress: Not on file   Relationships     Social connections     Talks on phone: Not on file     Gets together: Not on file     Attends [employer]ism service: Not on file     Active member of club or organization: Not on file     Attends meetings of clubs or organizations: Not on file     Relationship status: Not on file     Intimate partner violence     Fear of current or ex partner: Not on file     Emotionally abused: Not on file     Physically abused: Not on file     Forced sexual activity: Not on file   Other Topics Concern     Parent/sibling w/ CABG, MI or angioplasty before 65F 55M? No   Social History Narrative     Not on file       FAMILY HISTORY:    Family History   Problem Relation Age of Onset     Colon Polyps Father      Sleep Apnea Father      Ulcerative Colitis Paternal Grandfather         and paternal grandmother     Sleep Apnea Sister      Stomach Cancer Paternal Grandmother      Anesthesia Reaction No family hx of      Crohn's Disease No family hx of      Cancer No family hx of      Past/family/social history reviewed and no changes    PHYSICAL EXAMINATION:  Constitutional: aaox3, cooperative, pleasant, not dyspneic/diaphoretic, no acute distress  Vitals reviewed: Ht 1.778 m (5' 10\")   Wt 63.5 kg (140 lb)   BMI 20.09 kg/m    Wt:   Wt Readings from Last 2 Encounters:   06/22/21 63.5 kg (140 lb)   06/11/21 63.5 kg (140 lb)   General appearance: Healthy appearing adult, in no acute distress  Eyes: Sclera anicteric  Ears, nose, mouth and throat: No obvious external lesions of ears " and nose, Hearing intact  Neck: Symmetric, No obvious external lesions  Respiratory: Normal respiration, no use of accessory muscles   Skin: No rashes or jaundice   Psychiatric: Oriented to person, place and time, Appropriate mood and affect.     PERTINENT STUDIES:  Orders Only on 06/08/2021   Component Date Value Ref Range Status     COVID-19 Virus PCR to [facility] - So* 06/08/2021 Nasopharyngeal   Final     COVID-19 Virus PCR to [facility] - Re* 06/08/2021 Test received-See reflex to [facility] test SARS CoV2 (COVID-19) Virus RT-PCR   Final     SARS-CoV-2 Virus Specimen Source 06/08/2021 Nasopharyngeal   Final     SARS-CoV-2 PCR Result 06/08/2021 NEGATIVE   Final     SARS-CoV-2 PCR Comment 06/08/2021    Final                    Value:Testing was performed using the [employer]ptima SARS-CoV-2 Assay on the [employer] Instrument System.   Additional information about this Emergency Use Authorization (EUA) assay can be found via   the Lab Guide.       CT of the Abdomen and Pelvis with contrast, 11/12/2020 4:49 PM.     Comparison: Radiograph 11/6/2020.     History: Abdominal distention; Quadriplegia (H).      Technique: Axial images of the  abdomen and pelvis were obtained with  contrast. Coronal reconstructions were provided. Images were reviewed  in bone, lung, and soft tissue windows.      Total DLP: 294 mGy*cm.     Contrast: Isovue 370 92cc     Findings:  Chest:The visualized esophagus appears unremarkable. No suspicious  lung nodules. No evidence of lung infection. No pleural effusion.  Moderate volume pericardial effusion.       Abdomen and Pelvis: There are no suspicious hepatic lesions. No opaque  gallbladder calculi. No intrahepatic or extrahepatic biliary  dilatation. Pancreas unremarkable. Spleen size within normal limits.  Mild thickening of the left adrenal gland. The right adrenal gland is  unremarkable.Symmetric nephrographic renal phase. No evidence of  hydronephrosis. Moderately distended bladder.  No  suspicious  reproductive mass. No free fluid. No diverticulitis. Moderately  distended stomach. Dilated loops of stool and air-filled large bowel  along the anterior abdominal cavity. Redundant colon. Appendix  unremarkable. Atheromatous calcifications of the aortoiliac  vasculature. No suspicious or enlarged mesenteric, retroperitoneal and  pelvic lymph nodes.      Bones and Soft Tissues: No suspicious osseous lesion. Severe atrophy  of the muscle bulk.                                                                      Impression:   1. Moderate pericardial effusion.  2. Dilated loops of air and stool-filled large bowel which can be seen  with adynamic colonic ileus.     I have personally reviewed the examination and initial interpretation  and I agree with the findings.

## 2021-06-22 NOTE — PATIENT INSTRUCTIONS
It was a pleasure taking care of you today.  I've included a brief summary of our discussion and care plan from today's visit below.  Please review this information with your primary care provider.  ______________________________________________________________________    My recommendations are summarized as follows:    --Try to have small frequent meals throughout the day.  Avoid eating before laying down  --Okay to take famotidine as needed  --Let us know if the burning pain worsens, we can always do a short course of proton pump inhibitor  --Take Gas-X as needed, it is okay to take this more often  --I would recommend increasing frequency of Fleet enemas.  Start with taking this every 4 days, if this is helpful but you continue to have rectal pain you can take this with the rest of your bowel regimen every 2 days  -- please see scheduling information provided below     Return to GI Clinic as needed to review your progress.    ______________________________________________________________________    How do I schedule labs, imaging studies, or procedures that were ordered in clinic today?     Labs: To schedule lab appointment at the [facility], use my chart or call [phone]. If you have a [facility] closer to home where you are regularly seen you can give them a call.     Procedures: If a colonoscopy, upper endoscopy, breath test, esophageal manometry, or pH impedence was ordered today, our endoscopy team will call you to schedule this. If you have not heard from our endoscopy team within a week, please call [phone] to schedule.     Imaging Studies: If you were scheduled for a CT scan, X-ray, MRI, ultrasound, HIDA scan or other imaging study, please call [phone] to have this scheduled.     Referral: If a referral to another specialty was ordered, expect a phone call or follow instructions above. If you have not heard from anyone regarding your referral in a week, please call our  clinic to check the status.     Who do I call with any questions after my visit?  Please be in touch if there are any further questions that arise following today's visit.  There are multiple ways to contact your gastroenterology care team.        During business hours, you may reach a Gastroenterology nurse at [phone]      To schedule or reschedule an appointment, please call [phone].       You can always send a secure message through [employer].  [employer] messages are answered by your nurse or doctor typically within 24 hours.  Please allow extra time on weekends and holidays.        For urgent/emergent questions after business hours, you may reach the on-call GI Fellow by contacting the [facility]  at [phone].     How will I get the results of any tests ordered?    You will receive all of your results.  If you have signed up for [employer], any tests ordered at your visit will be available to you after your physician reviews them.  Typically this takes 1-2 weeks.  If there are urgent results that require a change in your care plan, your physician or nurse will call you to discuss the next steps.      What is [employer]?  [employer] is a secure way for you to access all of your healthcare records from the [facility].  It is a web based computer program, so you can sign on to it from any location.  It also allows you to send secure messages to your care team.  I recommend signing up for [employer] access if you have not already done so and are comfortable with using a computer.      How to I schedule a follow-up visit?  If you did not schedule a follow-up visit today, please call [phone] to schedule a follow-up office visit.      Sincerely,    Xenia Cox PA-C  Division of Gastroenterology, Hepatology & Nutrition  [facility]

## 2021-06-22 NOTE — NURSING NOTE
"Chief Complaint   Patient presents with     New Patient     GERD       Vitals:    06/22/21 1311   Weight: 63.5 kg (140 lb)   Height: 1.778 m (5' 10\")       Body mass index is 20.09 kg/m .      Camilo Cano LPN                        "

## 2021-06-22 NOTE — LETTER
6/22/2021         RE: Alex Rodríguez  [address]  [place] [address]        Dear Colleague,    Thank you for referring your patient, Alex Rodríguez, to the [facility] [place]. Please see a copy of my visit note below.    Joao is a 60 year old who is being evaluated via a billable video visit.      How would you like to obtain your AVS? MyChart  If the video visit is dropped, the invitation should be resent by: Text to cell phone: [phone]  Will anyone else be joining your video visit? No      Video Start Time: 2:01 PM  Video-Visit Details    Type of service:  Video Visit    Video End Time: 2:37 PM    Originating Location (pt. Location): Home    Distant Location (provider location):  [facility] [place]     Platform used for Video Visit: [employer]    GI CLINIC VISIT    CC/REFERRING MD:  Referred Self  REASON FOR CONSULTATION: GERD    ASSESSMENT/PLAN:  Alex Rodríguez is a 60 year C5-6 quadriplegic male, with a history of neurogenic bowel, hemorrhoids, abdominal pain.     1.  Abdominal pain  Patient reports about a year and a half of burning abdominal pain.  Was started on PPI which did improve his symptoms, dose was increased to 40 mg and when symptoms improved he was tapered off this medication.  Now continues on H2 blocker.  Recent EGD without evidence of complications of reflux or H. pylori.  Symptoms may be due to dyspepsia, functional dyspepsia, esophageal hypersensitivity.  Discussed he can continue H2 blocker at this time.  Would recommend lifestyle modifications for GERD including trying to eat smaller more frequent meals throughout the day.  If symptoms worsen, could plan for short course of PPI with 40 mg a day.  We will also plan to treat constipation as outlined below which may be contributing to burning abdominal pain.  --Try to have small frequent meals throughout the day.  Avoid eating before  laying down  --Okay to take famotidine as needed  --Let us know if the burning pain worsens, we can always do a short course of proton pump inhibitor    2.  Constipation, neurogenic bowel  Patient has longstanding history of constipation and neurogenic bowel, also has worsened recent rectal pain.  Per Dr. Bee, he had significant amount of gas on rectal exam which could be contributing to the pain.  Of note he does have an improvement with Fleet enema every 6 days.  Discussed plan for him to increase frequency of Fleet enema to more adequately empty his bowel.  Should continue MiraLAX and Metamucil, and bisacodyl as he has been.  He does find Gas-X helpful, he can take this more regularly.  Pending symptoms, may need to consider colectomy.  --Take Gas-X as needed, it is okay to take this more often  --I would recommend increasing frequency of Fleet enemas.  Start with taking this every 4 days, if this is helpful but you continue to have rectal pain you can take this with the rest of your bowel regimen every 2 days    Colorectal cancer screening: Given prep of previous colonoscopy, Dr. Adame recommended repeat screening colonoscopy with increased prep in 3 to 6 months    RTC as needed    Thank you for this consultation.  It was a pleasure to participate in the care of this patient; please contact us with any further questions.     55 Minutes was spent on the date of the encounter during chart review, history and exam, documentation, and further activities as noted     Note completed using voice recognition software. Some word and grammatical errors may occur.      Xenia Cox PA-C  Division of Gastroenterology, Hepatology & Nutrition  [facility]        HPI  Alex Rodríguez is a 60 year C5-6 quadriplegic male, with a history of neurogenic bowel, hemorrhoids, abdominal pain.  He is new to the [facility] GI clinic and this is my first encounter with the patient.    The  "patient reports in 1989 he had severe GI symptoms, he had a scope at the time without source of symptoms.     Patient reports for the last year he has felt abdominal and stomach burning. He believes this started about 6 months before COVID.  He started omeprazole 20 mg daily, this was then increased up to 40 mg for 5 months.  He then started weaning down with 20 mg, then 10 mg.  He has been totally off of PPI for 8 days.  He states that with time, his symptoms slowly improved, and he now has minor symptoms.  He continues to take H2 blocker with famotidine.  Of note, patient recently underwent upper endoscopy without H. pylori, ulcer, esophagitis.    Does report increased gas. This builds up throughout the day. He does take gas-ex when symptoms are severe.    The patient reports that he has a hoarse voice, this started several months ago.  Was recommended to see ENT.    For his bowel regimen, currently doing 10 mg Dulcolax suppostitory, 4-5 digitial stimulations every 48 hours. Fleet enemas- averaging now every 6 six days. This does seem to reduce the rectal pain. Pain is always there to some extent, it is tolerable. Severity improved from 7- 3. Will have three to four fists of stool when this happens. 1.5 tablespoons of metamucil in the morning, another in the evening. He also takes 8.6 mg of miralax. When he increases Miralax, his stools get runny. Consistency is soft, comes out like \"waffle batter\".     Diet recall:   Lunch:   Oatmeal for dinner   Cannot sit long enough to eat three meals     He was seen by Dr. Bee 5/12/2021 reporting constant rectal pain.  At the time he reported this slightly improved with antibiotics.  On exam, he the patient was found to have a fairly tight anal sphincter and he passed a large amount of gas with digital rectal exam.  He was recommended to try digitizing his rectum to help facilitate passing of gas, more frequent enemas, consideration of muscle relaxer and discussion " with urology given that his symptoms are improved with treatment for prostatitis.  An ostomy was also mentioned as a potential option.     PROBLEM LIST  Patient Active Problem List    Diagnosis Date Noted     Autonomic dysfunction/dysregulation, possibly dysreflexia 11/11/2013     Priority: Medium     Tetraplegia (H) 11/11/2013     Priority: Medium     Neurogenic bladder 10/08/2012     Priority: Medium     Problem list name updated by automated process. Provider to review       UTI (urinary tract infection) 08/02/2012     Priority: Medium     Shoulder pain 10/12/2010     Priority: Medium     Impingement syndrome, shoulder 10/12/2010     Priority: Medium       PERTINENT PAST MEDICAL HISTORY:  Past Medical History:   Diagnosis Date     History of spinal cord injury 06/28/1980     Neuromuscular disorder (H)      Unspecified site of spinal cord injury without evidence of spinal bone injury     C 5-6       PREVIOUS SURGERIES:  Past Surgical History:   Procedure Laterality Date     BACK SURGERY       COLONOSCOPY N/A 6/11/2021    Procedure: COLONOSCOPY;  Surgeon: Jay Adame MD;  Location:  GI     ESOPHAGOSCOPY, GASTROSCOPY, DUODENOSCOPY (EGD), COMBINED N/A 6/11/2021    Procedure: ESOPHAGOGASTRODUODENOSCOPY, WITH BIOPSY;  Surgeon: Jay Adame MD;  Location:  GI     GENITOURINARY SURGERY       SOFT TISSUE SURGERY         PREVIOUS ENDOSCOPY:  Impression:          No findings of GERD complications. Biopsies taken to                        rule out H pylori. Two gastric polyps were biopsied-                        likely fundic gland polyps.                        - Z-line regular, 47 cm from the incisors.                        - Normal esophagus.                        - Normal antrum and prepyloric region of the stomach.                        Biopsied.                        - Two gastric polyps. Biopsied.                        - Normal duodenal bulb and second portion of the                         duodenum.                        - The examination was otherwise normal.   Impression:          One polyp resected. An additional polyp was visualized                        in the ascending colon but unfortunately lost and unable                        to be visualized again despite extensive searching.                        - Hemorrhoids found on perianal exam.                        - One 3 mm polyp in the ascending colon. Resection not                        attempted.                        - One 5 mm polyp in the rectum, removed with a cold                        snare. Resected and retrieved.                        - Stool in the entire examined colon.                        - The examination was otherwise normal on direct and                        retroflexion views.   Recommendation:      - Discharge patient to home (with escort).                        - Resume previous diet.                        - Continue present medications.                        - Await pathology results.                        - Repeat colonoscopy in next 3-6 months with double prep.     FINAL DIAGNOSIS:   A. GASTRIC BIOPSY:   - Gastric antral and fundic-type mucosa with no significant histologic   abnormality   - Negative for intestinal metaplasia and dysplasia   - No H. pylori organisms identified on routine stain     B. GASTRIC POLYP, BODY, POLYPECTOMY:   - Gastric fundic polyp   - Negative for intestinal metaplasia and dysplasia   - No H. Pylori-like organisms identified on routine stain     C. RECTAL POLYP, POLYPECTOMY:   - Benign squamous lined polyp compatible with fibroepithelial polyp   - Negative for dysplasia and malignancy   - Vascular congestion and hemorrhage     ALLERGIES:     Allergies   Allergen Reactions     Augmentin Nausea and Vomiting       PERTINENT MEDICATIONS:    Current Outpatient Medications:      COMPRESSION STOCKINGS, 1 each daily Compression stockings, knee high to be worn during days, ok off at  "night. Medium 20-30 mmHg compression. 3 pair. 6 refills., Disp: 3 each, Rfl: 6     glycopyrrolate (ROBINUL) 1 MG tablet, Take 1 mg by mouth 4 times daily, Disp: , Rfl:      multivitamin, therapeutic with minerals (THERA-VIT-M) TABS, Take 1 tablet by mouth daily.  , Disp: , Rfl:      omeprazole (PRILOSEC) 20 MG DR capsule, Take 20 mg by mouth daily, Disp: , Rfl:      psyllium (METAMUCIL) 58.6 % packet, Take 1 packet by mouth 2 times daily.  , Disp: , Rfl:      sulfamethoxazole-trimethoprim (BACTRIM/SEPTRA) 400-80 MG tablet, Take 1 tablet by mouth daily, Disp: 90 tablet, Rfl: 3     tamsulosin (FLOMAX) 0.4 MG capsule, Take 1 capsule (0.4 mg) by mouth daily, Disp: 30 capsule, Rfl: 0     TERAZOSIN HCL PO, Take 5 mg by mouth daily., Disp: , Rfl:      acidophilus (BACID) 10 MG CAPS, Take 1 capsule by mouth 2 times daily.  , Disp: , Rfl:      bisacodyl (DULCOLAX) 5 MG EC tablet, Take 1 tablet (5 mg) by mouth See Admin Instructions Refer to \"Getting Ready for a Colonoscopy\" patient handout, Disp: 2 tablet, Rfl: 0     fluticasone (FLONASE) 50 MCG/ACT nasal spray, Spray 2 sprays into both nostrils daily.  , Disp: , Rfl:      ketorolac (TORADOL) 10 MG tablet, Take 1 tablet (10 mg) by mouth every 6 hours as needed for moderate pain (Patient not taking: Reported on 11/9/2020), Disp: 10 tablet, Rfl: 0     magnesium citrate solution, Take 296 mLs by mouth See Admin Instructions Refer to \"Getting Ready for a Colonoscopy\" patient handout., Disp: 296 mL, Rfl: 0     Nitrofurantoin Macrocrystal (MACRODANTIN PO), Take 100 mg in the morning and 200 mg in the evening., Disp: , Rfl:      polyethylene glycol (MIRALAX) packet, Take 238 g by mouth See Admin Instructions One 8.3-ounce bottle (238 g).  Refer to \"Getting Ready for a Colonoscopy\" patient handout, Disp: 238 g, Rfl: 0     polyethylene glycol (MIRALAX/GLYCOLAX) packet, Take 1 packet by mouth 2 times daily.  , Disp: , Rfl:     SOCIAL HISTORY:  Social History     Socioeconomic History " "    Marital status: Single     Spouse name: Not on file     Number of children: Not on file     Years of education: Not on file     Highest education level: Not on file   Occupational History     Not on file   Social Needs     Financial resource strain: Not on file     Food insecurity     Worry: Not on file     Inability: Not on file     Transportation needs     Medical: Not on file     Non-medical: Not on file   Tobacco Use     Smoking status: Never Smoker     Smokeless tobacco: Never Used   Substance and Sexual Activity     Alcohol use: Yes     Drug use: No     Sexual activity: Not Currently   Lifestyle     Physical activity     Days per week: Not on file     Minutes per session: Not on file     Stress: Not on file   Relationships     Social connections     Talks on phone: Not on file     Gets together: Not on file     Attends [employer] service: Not on file     Active member of club or organization: Not on file     Attends meetings of clubs or organizations: Not on file     Relationship status: Not on file     Intimate partner violence     Fear of current or ex partner: Not on file     Emotionally abused: Not on file     Physically abused: Not on file     Forced sexual activity: Not on file   Other Topics Concern     Parent/sibling w/ CABG, MI or angioplasty before 65F 55M? No   Social History Narrative     Not on file       FAMILY HISTORY:    Family History   Problem Relation Age of Onset     Colon Polyps Father      Sleep Apnea Father      Ulcerative Colitis Paternal Grandfather         and paternal grandmother     Sleep Apnea Sister      Stomach Cancer Paternal Grandmother      Anesthesia Reaction No family hx of      Crohn's Disease No family hx of      Cancer No family hx of      Past/family/social history reviewed and no changes    PHYSICAL EXAMINATION:  Constitutional: aaox3, cooperative, pleasant, not dyspneic/diaphoretic, no acute distress  Vitals reviewed: Ht 1.778 m (5' 10\")   Wt 63.5 kg (140 lb)   " BMI 20.09 kg/m    Wt:   Wt Readings from Last 2 Encounters:   06/22/21 63.5 kg (140 lb)   06/11/21 63.5 kg (140 lb)   General appearance: Healthy appearing adult, in no acute distress  Eyes: Sclera anicteric  Ears, nose, mouth and throat: No obvious external lesions of ears and nose, Hearing intact  Neck: Symmetric, No obvious external lesions  Respiratory: Normal respiration, no use of accessory muscles   Skin: No rashes or jaundice   Psychiatric: Oriented to person, place and time, Appropriate mood and affect.     PERTINENT STUDIES:  Orders Only on 06/08/2021   Component Date Value Ref Range Status     COVID-19 Virus PCR to [facility] - So* 06/08/2021 Nasopharyngeal   Final     COVID-19 Virus PCR to [facility] - Re* 06/08/2021 Test received-See reflex to [facility] test SARS CoV2 (COVID-19) Virus RT-PCR   Final     SARS-CoV-2 Virus Specimen Source 06/08/2021 Nasopharyngeal   Final     SARS-CoV-2 PCR Result 06/08/2021 NEGATIVE   Final     SARS-CoV-2 PCR Comment 06/08/2021    Final                    Value:Testing was performed using the Aptima SARS-CoV-2 Assay on the [employer] Instrument System.   Additional information about this Emergency Use Authorization (EUA) assay can be found via   the Lab Guide.       CT of the Abdomen and Pelvis with contrast, 11/12/2020 4:49 PM.     Comparison: Radiograph 11/6/2020.     History: Abdominal distention; Quadriplegia (H).      Technique: Axial images of the  abdomen and pelvis were obtained with  contrast. Coronal reconstructions were provided. Images were reviewed  in bone, lung, and soft tissue windows.      Total DLP: 294 mGy*cm.     Contrast: Isovue 370 92cc     Findings:  Chest:The visualized esophagus appears unremarkable. No suspicious  lung nodules. No evidence of lung infection. No pleural effusion.  Moderate volume pericardial effusion.       Abdomen and Pelvis: There are no suspicious hepatic lesions. No opaque  gallbladder calculi. No intrahepatic or extrahepatic  biliary  dilatation. Pancreas unremarkable. Spleen size within normal limits.  Mild thickening of the left adrenal gland. The right adrenal gland is  unremarkable.Symmetric nephrographic renal phase. No evidence of  hydronephrosis. Moderately distended bladder.  No suspicious  reproductive mass. No free fluid. No diverticulitis. Moderately  distended stomach. Dilated loops of stool and air-filled large bowel  along the anterior abdominal cavity. Redundant colon. Appendix  unremarkable. Atheromatous calcifications of the aortoiliac  vasculature. No suspicious or enlarged mesenteric, retroperitoneal and  pelvic lymph nodes.      Bones and Soft Tissues: No suspicious osseous lesion. Severe atrophy  of the muscle bulk.                                                                      Impression:   1. Moderate pericardial effusion.  2. Dilated loops of air and stool-filled large bowel which can be seen  with adynamic colonic ileus.     I have personally reviewed the examination and initial interpretation  and I agree with the findings.        Again, thank you for allowing me to participate in the care of your patient.        Sincerely,        Xenia Cox PA-C

## 2021-06-23 ENCOUNTER — TELEPHONE (OUTPATIENT)
Dept: GASTROENTEROLOGY | Facility: CLINIC | Age: 61
End: 2021-06-23

## 2021-06-23 NOTE — TELEPHONE ENCOUNTER
[facility] Call Center    Phone Message    May a detailed message be left on voicemail: yes     Reason for Call: Medication Question or concern regarding medication   Prescription Clarification  Name of Medication: simethicone (MYLICON) 125 MG chewable tablet  Prescribing Provider: Xenia Cox   Pharmacy: [facility]   What on the order needs clarification? Wanting to change this medication to a different medication as it is not covered by insurance.           Action Taken: Message routed to:  [facility] GI    Travel Screening: Not Applicable

## 2021-06-24 ENCOUNTER — TRANSCRIBE ORDERS (OUTPATIENT)
Dept: OTHER | Age: 61
End: 2021-06-24

## 2021-06-24 DIAGNOSIS — R49.0 HOARSENESS: Primary | ICD-10-CM

## 2021-06-24 NOTE — TELEPHONE ENCOUNTER
Per MD-Henrietta to take tamsulosin instead of Hytrin. Patient informed and RX called into preferred pharmacy.     Jaclyn Panchal LPN

## 2021-06-25 ENCOUNTER — TELEPHONE (OUTPATIENT)
Dept: PHYSICAL MEDICINE AND REHAB | Facility: CLINIC | Age: 61
End: 2021-06-25

## 2021-06-25 NOTE — TELEPHONE ENCOUNTER
Reached out to pt to let them know that provider recommends continuing Gas-x OTC, since it is not covered by his insurance.  Full understanding verbalized.

## 2021-06-25 NOTE — TELEPHONE ENCOUNTER
[facility] Call Center    Phone Message    May a detailed message be left on voicemail: no     Reason for Call: Other: Keila from [facility] calling requesting a return call to consult regarding a patient of theirs with a current spinal cord injury that is having shoulder spasms. Please call to discuss thank you.      Action Taken: Message routed to:  [facility]    Travel Screening: Not Applicable

## 2021-06-25 NOTE — TELEPHONE ENCOUNTER
[facility] Advised that patient has not been seen by PM&R for over 5 years. A referral will need to be placed.

## 2021-06-28 ENCOUNTER — TELEPHONE (OUTPATIENT)
Dept: SLEEP MEDICINE | Facility: CLINIC | Age: 61
End: 2021-06-28

## 2021-06-28 NOTE — TELEPHONE ENCOUNTER
Reason for call:  Other   Patient called regarding (reason for call): call back  Additional comments: Patient called seeking advice on what he should do while he cannot use his recalled cpap. He has already registered the device and has stopped using it. He is okay with a [employer] message back    Phone number to reach patient:  Cell number on file:    Telephone Information:   Mobile [phone]       Best Time:  any    Can we leave a detailed message on this number?  YES    Travel screening: Negative

## 2021-06-28 NOTE — TELEPHONE ENCOUNTER
Spoke with Keila at [facility] and gave her phone number and fax number to [facility]. She will have  Primary physician place therapy orders for the patient. If needed, she will call me to have patient be seen by PMR physician.

## 2021-07-01 NOTE — TELEPHONE ENCOUNTER
Patient called regarding B[employer] recall for their pap device.     Device type: AVAPS    Supplemental Oxygen: No     Current Durable Medical Equipment supplier: [facility]     Patient instructions:    Advised patient to continue using therapy until device is replaced or repaired.    Advised patient to avoid unapproved cleaning methods, such as ozone (see FDA safety communication on use of ozone ), and conditions involving high humidity and high temperature.    Advised patient to register for repair or replacement on the [employer] website.  Patient can call [employer] at [phone] for additional support.    Consider use of bacterial filter with reassessment of pressure needs.  Sources for filters:  [employer].com, [employer] or your DME.   Contact DME regarding pressure concerns if using filter and device pressures do not feel correct. Consider discontinuing using humidity with filter.     Please contact your DME to see if you are eligible to receive a new device if it is over 5 years old.    Does the patient have additional questions or concerns to be sent to the provider at this time?   Yes     Routing to provider regarding issues with stomach issues.  Please see [employer] message from 4/11/21

## 2021-07-05 ENCOUNTER — HOSPITAL ENCOUNTER (OUTPATIENT)
Dept: MRI IMAGING | Facility: CLINIC | Age: 61
Discharge: HOME | End: 2021-07-05
Attending: FAMILY MEDICINE | Admitting: FAMILY MEDICINE
Payer: MEDICARE

## 2021-07-05 DIAGNOSIS — M25.511 RIGHT SHOULDER PAIN: ICD-10-CM

## 2021-07-05 PROCEDURE — 73221 MRI JOINT UPR EXTREM W/O DYE: CPT | Mod: 26 | Performed by: RADIOLOGY

## 2021-07-05 PROCEDURE — 73221 MRI JOINT UPR EXTREM W/O DYE: CPT | Mod: RT

## 2021-07-06 NOTE — TELEPHONE ENCOUNTER
FUTURE VISIT INFORMATION      FUTURE VISIT INFORMATION:    Date: 8/25/21    Time: 1:00pm    Location: [facility] [place] [facility]  REFERRAL INFORMATION:    Referring providers clinic:  [facility]    Reason for visit/diagnosis  hoarseness    RECORDS REQUESTED FROM:       Clinic name Comments Records Status Imaging Status   [facility] scanned into chart under 6/22/21 Epic

## 2021-08-24 ENCOUNTER — DOCUMENTATION ONLY (OUTPATIENT)
Dept: SLEEP MEDICINE | Facility: CLINIC | Age: 61
End: 2021-08-24
Payer: MEDICARE

## 2021-08-24 NOTE — PROGRESS NOTES
Patient stated he stopped using his filter for his CPAP because he felt reduced airflow and one night it was making a loud noise.

## 2021-08-25 ENCOUNTER — PRE VISIT (OUTPATIENT)
Dept: OTOLARYNGOLOGY | Facility: CLINIC | Age: 61
End: 2021-08-25

## 2021-09-04 ENCOUNTER — HEALTH MAINTENANCE LETTER (OUTPATIENT)
Age: 61
End: 2021-09-04

## 2021-09-10 DIAGNOSIS — G47.33 OSA (OBSTRUCTIVE SLEEP APNEA): Primary | ICD-10-CM

## 2021-10-18 ENCOUNTER — TRANSFERRED RECORDS (OUTPATIENT)
Dept: HEALTH INFORMATION MANAGEMENT | Facility: CLINIC | Age: 61
End: 2021-10-18
Payer: MEDICARE

## 2022-02-19 ENCOUNTER — HEALTH MAINTENANCE LETTER (OUTPATIENT)
Age: 62
End: 2022-02-19

## 2022-02-23 ENCOUNTER — DOCUMENTATION ONLY (OUTPATIENT)
Dept: SLEEP MEDICINE | Facility: CLINIC | Age: 62
End: 2022-02-23
Payer: MEDICARE

## 2022-02-23 NOTE — PROGRESS NOTES
2/23/2022- JL- PER PT REQUEST I SENT 2 VELCRO STRAPS TO PUT ALONG SIDE OF HEADGEAR TO COVER SILICONE STRAPS ON CHECKS.

## 2022-05-05 ENCOUNTER — TRANSFERRED RECORDS (OUTPATIENT)
Dept: HEALTH INFORMATION MANAGEMENT | Facility: CLINIC | Age: 62
End: 2022-05-05
Payer: MEDICARE

## 2022-06-13 ENCOUNTER — VIRTUAL VISIT (OUTPATIENT)
Dept: UROLOGY | Facility: CLINIC | Age: 62
End: 2022-06-13
Payer: MEDICARE

## 2022-06-13 VITALS — WEIGHT: 145 LBS | HEIGHT: 70 IN | BODY MASS INDEX: 20.76 KG/M2

## 2022-06-13 DIAGNOSIS — N31.9 NEUROGENIC BLADDER: Primary | ICD-10-CM

## 2022-06-13 PROCEDURE — 99214 OFFICE O/P EST MOD 30 MIN: CPT | Mod: 95 | Performed by: UROLOGY

## 2022-06-13 ASSESSMENT — PAIN SCALES - GENERAL: PAINLEVEL: NO PAIN (0)

## 2022-06-13 NOTE — PROGRESS NOTES
PT WILL MEET YOU IN MYCHART    Joao is a 61 year old who is being evaluated via a billable video visit.      How would you like to obtain your AVS? [employer]  If the video visit is dropped, the invitation should be resent by: Text to cell phone: [phone]  Will anyone else be joining your video visit? No         Office Visit Note  [facility]  [phone]    UROLOGIC DIAGNOSES:   Urinary incontinence bladder    CURRENT INTERVENTIONS:   Condom catheter    HISTORY:   This is a 61-year-old gentleman who has had paraplegia since an accident in 1980 and he had a sphincterotomy performed in 1981.  Since that time he has been urinating into condom catheters without difficulty.  He saw Dr. Hebert previously when he had an episode of prostatitis and subsequent elevated PSA.  These both resolved.  He made this appointment today because he had another episode of prostatitis in February.  He says that the symptom he gets is rectal pain.  He was evaluated by colorectal surgery and was found to have no abnormalities.  He was then presumed he had prostatitis and he was treated for prostatitis and the symptoms resolved.  He was treated with Levaquin.  He currently feels well with no symptoms or problems.  His last PSA from 1 year ago was 0.92.      PAST MEDICAL HISTORY:   Past Medical History:   Diagnosis Date     History of spinal cord injury 06/28/1980     Neuromuscular disorder (H)      Unspecified site of spinal cord injury without evidence of spinal bone injury     C 5-6       PAST SURGICAL HISTORY:   Past Surgical History:   Procedure Laterality Date     BACK SURGERY       COLONOSCOPY N/A 6/11/2021    Procedure: COLONOSCOPY;  Surgeon: Jay Adame MD;  Location:  GI     ESOPHAGOSCOPY, GASTROSCOPY, DUODENOSCOPY (EGD), COMBINED N/A 6/11/2021    Procedure: ESOPHAGOGASTRODUODENOSCOPY, WITH BIOPSY;  Surgeon: Jay Adame MD;  Location:  GI     GENITOURINARY SURGERY       SOFT TISSUE SURGERY          FAMILY HISTORY:   Family History   Problem Relation Age of Onset     Colon Polyps Father      Sleep Apnea Father      Ulcerative Colitis Paternal Grandfather         and paternal grandmother     Sleep Apnea Sister      Stomach Cancer Paternal Grandmother      Anesthesia Reaction No family hx of      Crohn's Disease No family hx of      Cancer No family hx of        SOCIAL HISTORY:   Social History     Tobacco Use     Smoking status: Never Smoker     Smokeless tobacco: Never Used   Substance Use Topics     Alcohol use: Yes       REVIEW OF SYSTEMS:  Skin: No rash, pruritis, or skin pigmentation  Eyes: No changes in vision  Ears/Nose/Throat: No changes in hearing, no nosebleeds  Respiratory: No shortness of breath, dyspnea on exertion, cough, or hemoptysis  Cardiovascular: No chest pain or palpitations  Gastrointestinal: No diarrhea or constipation. No abdominal pain. No hematochezia  Genitourinary: see HPI  Musculoskeletal: No pain or swelling of joints, normal range of motion  Neurologic: No weakness or tremors  Psychiatric: No recent changes in memory or mood  Hematologic/Lymphatic/Immunologic: No easy bruising or enlarged lymph nodes  Endocrine: No weight gain or loss      PHYSICAL EXAM:    General: Alert and oriented to time, place, and self. In NAD   HEENT: Head AT/NC, EOMI, CN Grossly intact   Lungs: no respiratory distress, or pursed lip breathing   Heart: No obvious jugular venous distension present   Musculoskeltal: Normal movements. Normal appearing musculature  Skin: no suspicious lesions or rashes   Neuro: Alert, oriented, speech and mentation normal; moving all 4 extremities equally.   Psych: affect and mood normal    Imaging: None    Urinalysis: UA RESULTS:  No results for input(s): COLOR, APPEARANCE, URINEGLC, URINEBILI, URINEKETONE, SG, UBLD, URINEPH, PROTEIN, UROBILINOGEN, NITRITE, LEUKEST, RBCU, WBCU in the last [id number] hours.    PSA: 0.92    Post Void Residual:     Other labs: None  today      IMPRESSION:  History of sphincterotomy and prostatitis    PLAN:  He sounds as if he has done very well since his sphincterotomy in 1981.  He has been draining into a condom catheter without difficulty.  He has had 2 episodes of presumed prostatitis now.  His symptoms have resolved.  I would not recommend any further urologic intervention at this time.  If he does get symptoms in the future and if they are resolved with antibiotics, he should continue to intervene with antibiotics as he is done previously.  He should continue to see his primary care provider annually for PSA checks.  He is welcome to follow-up with me as needed in the future.  Additional time was spent reviewing his records today.  And today I spent a total 25 minutes reviewing records, speaking with the patient, and documenting.      Supa De Leon M.D.              Video Start Time: 1:45 PM  Video-Visit Details    Type of service:  Video Visit    Video End Time:1:58 PM    Originating Location (pt. Location): Home    Distant Location (provider location):  [facility] [place]     Platform used for Video Visit: HTP

## 2022-06-13 NOTE — LETTER
6/13/2022       RE: Alex Rodríguez  [address]  [place] [address]     Dear Colleague,    Thank you for referring your patient, Alex Rodríguez, to the [facility] [place] [facility]. Please see a copy of my visit note below.    PT WILL MEET YOU IN MYCHART    Joao is a 61 year old who is being evaluated via a billable video visit.      How would you like to obtain your AVS? [facility]  If the video visit is dropped, the invitation should be resent by: Text to cell phone: [phone]  Will anyone else be joining your video visit? No         Office Visit Note  [facility]  [phone]    UROLOGIC DIAGNOSES:   Urinary incontinence bladder    CURRENT INTERVENTIONS:   Condom catheter    HISTORY:   This is a 61-year-old gentleman who has had paraplegia since an accident in 1980 and he had a sphincterotomy performed in 1981.  Since that time he has been urinating into condom catheters without difficulty.  He saw Dr. Hebert previously when he had an episode of prostatitis and subsequent elevated PSA.  These both resolved.  He made this appointment today because he had another episode of prostatitis in February.  He says that the symptom he gets is rectal pain.  He was evaluated by colorectal surgery and was found to have no abnormalities.  He was then presumed he had prostatitis and he was treated for prostatitis and the symptoms resolved.  He was treated with Levaquin.  He currently feels well with no symptoms or problems.  His last PSA from 1 year ago was 0.92.      PAST MEDICAL HISTORY:   Past Medical History:   Diagnosis Date     History of spinal cord injury 06/28/1980     Neuromuscular disorder (H)      Unspecified site of spinal cord injury without evidence of spinal bone injury     C 5-6       PAST SURGICAL HISTORY:   Past Surgical History:   Procedure Laterality Date     BACK SURGERY       COLONOSCOPY  N/A 6/11/2021    Procedure: COLONOSCOPY;  Surgeon: Jay Adame MD;  Location:  GI     ESOPHAGOSCOPY, GASTROSCOPY, DUODENOSCOPY (EGD), COMBINED N/A 6/11/2021    Procedure: ESOPHAGOGASTRODUODENOSCOPY, WITH BIOPSY;  Surgeon: Jay Adame MD;  Location:  GI     GENITOURINARY SURGERY       SOFT TISSUE SURGERY         FAMILY HISTORY:   Family History   Problem Relation Age of Onset     Colon Polyps Father      Sleep Apnea Father      Ulcerative Colitis Paternal Grandfather         and paternal grandmother     Sleep Apnea Sister      Stomach Cancer Paternal Grandmother      Anesthesia Reaction No family hx of      Crohn's Disease No family hx of      Cancer No family hx of        SOCIAL HISTORY:   Social History     Tobacco Use     Smoking status: Never Smoker     Smokeless tobacco: Never Used   Substance Use Topics     Alcohol use: Yes       REVIEW OF SYSTEMS:  Skin: No rash, pruritis, or skin pigmentation  Eyes: No changes in vision  Ears/Nose/Throat: No changes in hearing, no nosebleeds  Respiratory: No shortness of breath, dyspnea on exertion, cough, or hemoptysis  Cardiovascular: No chest pain or palpitations  Gastrointestinal: No diarrhea or constipation. No abdominal pain. No hematochezia  Genitourinary: see HPI  Musculoskeletal: No pain or swelling of joints, normal range of motion  Neurologic: No weakness or tremors  Psychiatric: No recent changes in memory or mood  Hematologic/Lymphatic/Immunologic: No easy bruising or enlarged lymph nodes  Endocrine: No weight gain or loss      PHYSICAL EXAM:    General: Alert and oriented to time, place, and self. In NAD   HEENT: Head AT/NC, EOMI, CN Grossly intact   Lungs: no respiratory distress, or pursed lip breathing   Heart: No obvious jugular venous distension present   Musculoskeltal: Normal movements. Normal appearing musculature  Skin: no suspicious lesions or rashes   Neuro: Alert, oriented, speech and mentation normal; moving all 4 extremities  equally.   Psych: affect and mood normal    Imaging: None    Urinalysis: UA RESULTS:  No results for input(s): COLOR, APPEARANCE, URINEGLC, URINEBILI, URINEKETONE, SG, UBLD, URINEPH, PROTEIN, UROBILINOGEN, NITRITE, LEUKEST, RBCU, WBCU in the last [id number] hours.    PSA: 0.92    Post Void Residual:     Other labs: None today      IMPRESSION:  History of sphincterotomy and prostatitis    PLAN:  He sounds as if he has done very well since his sphincterotomy in 1981.  He has been draining into a condom catheter without difficulty.  He has had 2 episodes of presumed prostatitis now.  His symptoms have resolved.  I would not recommend any further urologic intervention at this time.  If he does get symptoms in the future and if they are resolved with antibiotics, he should continue to intervene with antibiotics as he is done previously.  He should continue to see his primary care provider annually for PSA checks.  He is welcome to follow-up with me as needed in the future.  Additional time was spent reviewing his records today.  And today I spent a total 25 minutes reviewing records, speaking with the patient, and documenting.      Supa De Leon M.D.              Video Start Time: 1:45 PM  Video-Visit Details    Type of service:  Video Visit    Video End Time:1:58 PM    Originating Location (pt. Location): Home    Distant Location (provider location):  [facility] [place]     Platform used for Video Visit: [employer]

## 2022-07-03 ENCOUNTER — MYC MEDICAL ADVICE (OUTPATIENT)
Dept: GASTROENTEROLOGY | Facility: CLINIC | Age: 62
End: 2022-07-03

## 2022-07-03 DIAGNOSIS — K59.00 CONSTIPATION, UNSPECIFIED CONSTIPATION TYPE: Primary | ICD-10-CM

## 2022-07-15 DIAGNOSIS — N20.0 CALCULUS OF KIDNEY: Primary | ICD-10-CM

## 2022-07-26 ENCOUNTER — ANCILLARY PROCEDURE (OUTPATIENT)
Dept: GENERAL RADIOLOGY | Facility: CLINIC | Age: 62
End: 2022-07-26
Attending: FAMILY MEDICINE
Payer: MEDICARE

## 2022-07-26 DIAGNOSIS — N39.0 RECURRENT URINARY TRACT INFECTION: ICD-10-CM

## 2022-07-26 DIAGNOSIS — Z90.5 HISTORY OF PARTIAL NEPHRECTOMY: ICD-10-CM

## 2022-07-26 DIAGNOSIS — N20.0 NEPHROLITHIASIS: ICD-10-CM

## 2022-07-26 PROCEDURE — 74018 RADEX ABDOMEN 1 VIEW: CPT | Performed by: RADIOLOGY

## 2022-08-29 ENCOUNTER — TELEPHONE (OUTPATIENT)
Dept: PHYSICAL MEDICINE AND REHAB | Facility: CLINIC | Age: 62
End: 2022-08-29

## 2022-08-29 NOTE — TELEPHONE ENCOUNTER
[facility] Call Center    Phone Message    May a detailed message be left on voicemail: yes     Reason for Call: Appointment Intake    Referring Provider Name: Karina Sr, PD  Diagnosis and/or Symptoms: Quadraplegia    Patient is calling to schedule with Marisa Macario MD. Patient is wondering if she would see for Quadraplegia issues.    Please contact patient to discuss at [phone]. Can contact patient via [employer] if unable to get ahold of patient.    Action Taken: Message routed to:  Other: PM&R    Travel Screening: Not Applicable

## 2022-08-30 NOTE — TELEPHONE ENCOUNTER
Tried to reach out to pt, Dr Macario specialty is Bone health. Left message to call back to discuss further as  I don't see referral as well.

## 2022-10-11 ENCOUNTER — TELEPHONE (OUTPATIENT)
Dept: UROLOGY | Facility: CLINIC | Age: 62
End: 2022-10-11

## 2022-10-11 NOTE — TELEPHONE ENCOUNTER
----- Message from Supa De Leon MD sent at 10/7/2022 12:15 PM CDT -----  Can this patient please do a video visit with me on Monday at 12:15? (10/10)  thanks

## 2022-10-13 ENCOUNTER — TELEPHONE (OUTPATIENT)
Dept: UROLOGY | Facility: CLINIC | Age: 62
End: 2022-10-13

## 2022-10-13 NOTE — TELEPHONE ENCOUNTER
I received a communication from this patient's primary care provider    I am placing it into this note below for reference for future appointments:      Dear Dr. De Leon,  I am writing to you regarding our mutual patient, Alex Rodríguez. As we do not share an EMR, I wanted to summarize his recent history and send along the pertinent labsand cultures for your review. My questions are:  -what, if any, antibiotic should we be using as prophylaxis for UTI  -should we have Joao self-treat when symptoms arise? It is difficult for him to arrange transport to clinic, and he can only provide a urine sample on alternating days becauseof a complicated bowel regimen.  -should we recheck renal U/S and KUB? Are we looking for renal stones that could be a source of recurrent infections?  -current urine culture showed >100,000 staph epidermidis, but was after 2 weeks of levaquin (which did work but symptoms quickly returned) and taken from a condomcath, only sensitivities were for nitrofurantoin, tetracycline, vanco. I don't think I can rely on these results, correct? He is having symptoms again, and I am restartinglevaquin, even though culture showed resistance, I don't think I can go by this culture.  As you know, Joao is a quadriplegic who has had recurrent UTI's, prostatitis, and was hospitalized in 2012 for urosepsis with pseudomonas. 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 He is s/p surgery for left kidney stone and open pyelolithotomy with partial nephrectomy in 1981. He uses a condom catheter. In the past hetook up to 300mg daily of macrobid (!), and most recently was taking bactrim as his prophylactic antibiotic. When he was on the macrobid, he went 7-8 years without aninfection. He has been on the bactrim prophy since I took over his primary care in 2020, and since then, he has had the following episodes:  10/2020: UTI symptoms (chills, sweats, cloudy urine, prostate pain), treated with Bactrim DS,  "cutlure showed >100,000 Klebsiella Pneumoniae RESISTANT to bactrim, lux switched to levaquin and had to continue that for 4 weeks; \"prostate pain\" continued, he was seen by his urologist at that time, CT scan showed severe constipation,and pain resolved with enemas in November.  7/2021: UTI sxs, treated with Levaquin, urine cx showed 50,000-100,000 pseudomonas (?colonizer, infection?), RESISTANT to levaquin, but his symtpoms resolved after 5days of levaquin. Stopped antibiotics. Symptoms quickly returned and I prescribed cipro x 10 days which led to resolution.  10/2021: UTI sxs, urine culture contaminated (mixed rajan), treated with Levaquin x 1 week with resolution of symptoms.  3/2022: UTI sxs and \"prostate pain\" : treated with levaquin x 6 weeks, most sxs resolved quickly, but the prostate pain was slow to improve, also tried to work on bowelregimen as this had been the issue previously. Tried switching to Bactrim DS for an additional 2 weeks, to complete an 8 week total course of antibiotic, and his symptomsfully resolved.  6/2022: UTI sxs and \"prostate pain\" started about a week after his visit with you. The patient started on levaquin he had at home right away and used mag citrate to cleanout bowels; all symptoms improved very quickly. Completed a 2 week course of levaquin and came in for labs which were all normal; PSA 2.29, CBC normal, CMP normalwith creatinine 0.46, CRP 1.8. This was when he collected the urine sample via condom cath showing staph epidermidis. Two days later, chills, fever, and cloudy urinereturned. Restarted levaquin as it had worked before. Looking for advice on where to go from here, if you have any further recommendations. I appreciate it.  "

## 2022-10-22 ENCOUNTER — HEALTH MAINTENANCE LETTER (OUTPATIENT)
Age: 62
End: 2022-10-22

## 2022-10-25 ENCOUNTER — VIRTUAL VISIT (OUTPATIENT)
Dept: UROLOGY | Facility: CLINIC | Age: 62
End: 2022-10-25
Payer: MEDICARE

## 2022-10-25 VITALS — HEIGHT: 70 IN | WEIGHT: 145 LBS | BODY MASS INDEX: 20.76 KG/M2

## 2022-10-25 DIAGNOSIS — N39.0 URINARY TRACT INFECTION WITHOUT HEMATURIA, SITE UNSPECIFIED: Primary | ICD-10-CM

## 2022-10-25 PROCEDURE — 99214 OFFICE O/P EST MOD 30 MIN: CPT | Mod: 95 | Performed by: UROLOGY

## 2022-10-25 RX ORDER — NITROFURANTOIN 25; 75 MG/1; MG/1
100 CAPSULE ORAL 2 TIMES DAILY
Qty: 90 CAPSULE | Refills: 4 | Status: SHIPPED | OUTPATIENT
Start: 2022-10-25 | End: 2023-10-24

## 2022-10-25 RX ORDER — LEVOFLOXACIN 500 MG/1
500 TABLET, FILM COATED ORAL DAILY
Qty: 30 TABLET | Refills: 0 | Status: SHIPPED | OUTPATIENT
Start: 2022-10-25 | End: 2023-06-07

## 2022-10-25 ASSESSMENT — PAIN SCALES - GENERAL: PAINLEVEL: MODERATE PAIN (5)

## 2022-10-25 NOTE — LETTER
10/25/2022       RE: Alex Rodríugez  [address]  [place] [address]     Dear Colleague,    Thank you for referring your patient, Alex Rodríguez, to the [facility] [place] [facility]. Please see a copy of my visit note below.    PT WILL MEET YOU IN MYCHART  PT STATES HE IS HAVING PAIN IN THE PROSTATE AREA    Joao is a 62 year old who is being evaluated via a billable video visit.      How would you like to obtain your AVS? MyChart  If the video visit is dropped, the invitation should be resent by: Text to cell phone: [phone]  Will anyone else be joining your video visit? No        Office Visit Note  [facility]  [phone]    UROLOGIC DIAGNOSES:   Neurogenic bladder  Urinary incontinence  Prostatitis    CURRENT INTERVENTIONS:   Prior sphincterotomy in 1981  Condom catheter    HISTORY:   Joao set up a virtual visit today because he has been having difficulty with prostatitis and pelvic pain.  He saw Dr. Hebert years ago and he said that for 8 years he took nitrofurantoin daily for prevention of urinary tract infections and that he never had a single infection while on medication.  He then switched to Bactrim about 2 years ago and has had multiple infections since that time.  He says he feels that he has an infection when he has pelvic pain.  He is usually treated with Levaquin for 8 weeks and his symptoms usually resolve.  He has sometimes had proven infections while catching a clean-catch specimen.  Other times, urine samples have been collected from the condom catheter.  He currently is 4 weeks into a great week Levaquin course and he currently feels well.  He had a renal ultrasound performed recently that was normal.      PAST MEDICAL HISTORY:   Past Medical History:   Diagnosis Date     History of spinal cord injury 06/28/1980     Neuromuscular disorder (H)      Unspecified site of spinal cord  injury without evidence of spinal bone injury     C 5-6       PAST SURGICAL HISTORY:   Past Surgical History:   Procedure Laterality Date     BACK SURGERY       COLONOSCOPY N/A 6/11/2021    Procedure: COLONOSCOPY;  Surgeon: Jay Adame MD;  Location:  GI     ESOPHAGOSCOPY, GASTROSCOPY, DUODENOSCOPY (EGD), COMBINED N/A 6/11/2021    Procedure: ESOPHAGOGASTRODUODENOSCOPY, WITH BIOPSY;  Surgeon: Jay Adame MD;  Location:  GI     GENITOURINARY SURGERY       SOFT TISSUE SURGERY         FAMILY HISTORY:   Family History   Problem Relation Age of Onset     Colon Polyps Father      Sleep Apnea Father      Ulcerative Colitis Paternal Grandfather         and paternal grandmother     Sleep Apnea Sister      Stomach Cancer Paternal Grandmother      Anesthesia Reaction No family hx of      Crohn's Disease No family hx of      Cancer No family hx of        SOCIAL HISTORY:   Social History     Tobacco Use     Smoking status: Never     Smokeless tobacco: Never   Substance Use Topics     Alcohol use: Yes       REVIEW OF SYSTEMS:  Skin: No rash, pruritis, or skin pigmentation  Eyes: No changes in vision  Ears/Nose/Throat: No changes in hearing, no nosebleeds  Respiratory: No shortness of breath, dyspnea on exertion, cough, or hemoptysis  Cardiovascular: No chest pain or palpitations  Gastrointestinal: No diarrhea or constipation. No abdominal pain. No hematochezia  Genitourinary: see HPI  Musculoskeletal: No pain or swelling of joints, normal range of motion  Neurologic: No weakness or tremors  Psychiatric: No recent changes in memory or mood  Hematologic/Lymphatic/Immunologic: No easy bruising or enlarged lymph nodes  Endocrine: No weight gain or loss      PHYSICAL EXAM:    General: Alert and oriented to time, place, and self. In NAD   HEENT: Head AT/NC, EOMI, CN Grossly intact   Lungs: no respiratory distress, or pursed lip breathing   Heart: No obvious jugular venous distension present   Musculoskeltal: Normal  movements. Normal appearing musculature  Skin: no suspicious lesions or rashes   Neuro: Alert, oriented, speech and mentation normal; moving all 4 extremities equally.   Psych: affect and mood normal    Imaging: None    Urinalysis: UA RESULTS:  No results for input(s): COLOR, APPEARANCE, URINEGLC, URINEBILI, URINEKETONE, SG, UBLD, URINEPH, PROTEIN, UROBILINOGEN, NITRITE, LEUKEST, RBCU, WBCU in the last 87335 hours.    PSA: 2.29    Post Void Residual:     Other labs: None today      IMPRESSION:  Infections and possible prostatitis    PLAN:  He has had multiple problems with possible urinary tract infections and possible prostatitis.  Antibiotics do seem to improve his symptoms.  Given his history of having taken nitrofurantoin daily in the past and the great success with that treatment I would recommend that he simply go back on the nitrofurantoin daily.  He is very agreeable to this plan.  I wrote a prescription for nitrofurantoin to be taken 100 mg daily.  If he has further problems with symptoms or infections I instructed him to contact me via [employer].  Otherwise, if he is doing well, I will have him see our physician assistant on an annual basis for refill of his daily nitrofurantoin.    Additional time was spent today reviewing records from his outside physician and we had a lengthy discussion today as well.  30 minutes spent total today.      Suap De Leon M.D.              Video-Visit Details    Video Start Time: 2:38 PM    Type of service:  Video Visit    Video End Time:2:57 PM    Originating Location (pt. Location): Home        Distant Location (provider location):  On-site    Platform used for Video Visit: [employer]

## 2022-10-25 NOTE — PROGRESS NOTES
PT WILL MEET YOU IN [place]  PT STATES HE IS HAVING PAIN IN THE PROSTATE AREA    Joao is a 62 year old who is being evaluated via a billable video visit.      How would you like to obtain your AVS? Johny  If the video visit is dropped, the invitation should be resent by: Text to cell phone: [phone]  Will anyone else be joining your video visit? No        Office Visit Note  [facility]  [phone]    UROLOGIC DIAGNOSES:   Neurogenic bladder  Urinary incontinence  Prostatitis    CURRENT INTERVENTIONS:   Prior sphincterotomy in 1981  Condom catheter    HISTORY:   Joao set up a virtual visit today because he has been having difficulty with prostatitis and pelvic pain.  He saw Dr. Hebert years ago and he said that for 8 years he took nitrofurantoin daily for prevention of urinary tract infections and that he never had a single infection while on medication.  He then switched to Bactrim about 2 years ago and has had multiple infections since that time.  He says he feels that he has an infection when he has pelvic pain.  He is usually treated with Levaquin for 8 weeks and his symptoms usually resolve.  He has sometimes had proven infections while catching a clean-catch specimen.  Other times, urine samples have been collected from the condom catheter.  He currently is 4 weeks into a great week Levaquin course and he currently feels well.  He had a renal ultrasound performed recently that was normal.      PAST MEDICAL HISTORY:   Past Medical History:   Diagnosis Date     History of spinal cord injury 06/28/1980     Neuromuscular disorder (H)      Unspecified site of spinal cord injury without evidence of spinal bone injury     C 5-6       PAST SURGICAL HISTORY:   Past Surgical History:   Procedure Laterality Date     BACK SURGERY       COLONOSCOPY N/A 6/11/2021    Procedure: COLONOSCOPY;  Surgeon: Jay Adame MD;  Location:  GI     ESOPHAGOSCOPY, GASTROSCOPY, DUODENOSCOPY (EGD), COMBINED  N/A 6/11/2021    Procedure: ESOPHAGOGASTRODUODENOSCOPY, WITH BIOPSY;  Surgeon: Jay Adame MD;  Location:  GI     GENITOURINARY SURGERY       SOFT TISSUE SURGERY         FAMILY HISTORY:   Family History   Problem Relation Age of Onset     Colon Polyps Father      Sleep Apnea Father      Ulcerative Colitis Paternal Grandfather         and paternal grandmother     Sleep Apnea Sister      Stomach Cancer Paternal Grandmother      Anesthesia Reaction No family hx of      Crohn's Disease No family hx of      Cancer No family hx of        SOCIAL HISTORY:   Social History     Tobacco Use     Smoking status: Never     Smokeless tobacco: Never   Substance Use Topics     Alcohol use: Yes       REVIEW OF SYSTEMS:  Skin: No rash, pruritis, or skin pigmentation  Eyes: No changes in vision  Ears/Nose/Throat: No changes in hearing, no nosebleeds  Respiratory: No shortness of breath, dyspnea on exertion, cough, or hemoptysis  Cardiovascular: No chest pain or palpitations  Gastrointestinal: No diarrhea or constipation. No abdominal pain. No hematochezia  Genitourinary: see HPI  Musculoskeletal: No pain or swelling of joints, normal range of motion  Neurologic: No weakness or tremors  Psychiatric: No recent changes in memory or mood  Hematologic/Lymphatic/Immunologic: No easy bruising or enlarged lymph nodes  Endocrine: No weight gain or loss      PHYSICAL EXAM:    General: Alert and oriented to time, place, and self. In NAD   HEENT: Head AT/NC, EOMI, CN Grossly intact   Lungs: no respiratory distress, or pursed lip breathing   Heart: No obvious jugular venous distension present   Musculoskeltal: Normal movements. Normal appearing musculature  Skin: no suspicious lesions or rashes   Neuro: Alert, oriented, speech and mentation normal; moving all 4 extremities equally.   Psych: affect and mood normal    Imaging: None    Urinalysis: UA RESULTS:  No results for input(s): COLOR, APPEARANCE, URINEGLC, URINEBILI, URINEKETONE, SG,  UBLD, URINEPH, PROTEIN, UROBILINOGEN, NITRITE, LEUKEST, RBCU, WBCU in the last 73762 hours.    PSA: 2.29    Post Void Residual:     Other labs: None today      IMPRESSION:  Infections and possible prostatitis    PLAN:  He has had multiple problems with possible urinary tract infections and possible prostatitis.  Antibiotics do seem to improve his symptoms.  Given his history of having taken nitrofurantoin daily in the past and the great success with that treatment I would recommend that he simply go back on the nitrofurantoin daily.  He is very agreeable to this plan.  I wrote a prescription for nitrofurantoin to be taken 100 mg daily.  If he has further problems with symptoms or infections I instructed him to contact me via [employer].  Otherwise, if he is doing well, I will have him see our physician assistant on an annual basis for refill of his daily nitrofurantoin.    Additional time was spent today reviewing records from his outside physician and we had a lengthy discussion today as well.  30 minutes spent total today.      Supa De Leon M.D.              Video-Visit Details    Video Start Time: 2:38 PM    Type of service:  Video Visit    Video End Time:2:57 PM    Originating Location (pt. Location): Home        Distant Location (provider location):  On-site    Platform used for Video Visit: Rachell

## 2023-01-05 DIAGNOSIS — G47.33 OBSTRUCTIVE SLEEP APNEA (ADULT) (PEDIATRIC): Primary | ICD-10-CM

## 2023-01-06 NOTE — CONFIDENTIAL NOTE
62-year-old male on average volume assured ventilatory support the setting of C4-6 tetraplegia currently using AVAPS with backup rate of 8 tidal volume of 350 and IPAP 8-14 EPAP 6.  With average use of 8.6 hours, AHI of less than 10 average breath rate of 12 and  tidal volume of 341.  Orders placed for supplies.

## 2023-04-01 ENCOUNTER — HEALTH MAINTENANCE LETTER (OUTPATIENT)
Age: 63
End: 2023-04-01

## 2023-04-07 ENCOUNTER — TRANSCRIBE ORDERS (OUTPATIENT)
Dept: OTHER | Age: 63
End: 2023-04-07

## 2023-04-07 DIAGNOSIS — M54.2 NECK PAIN: Primary | ICD-10-CM

## 2023-05-26 ENCOUNTER — THERAPY VISIT (OUTPATIENT)
Dept: PHYSICAL THERAPY | Facility: CLINIC | Age: 63
End: 2023-05-26
Attending: FAMILY MEDICINE
Payer: MEDICARE

## 2023-05-26 DIAGNOSIS — M54.2 NECK PAIN: ICD-10-CM

## 2023-05-26 PROCEDURE — 97110 THERAPEUTIC EXERCISES: CPT | Mod: GP

## 2023-05-26 PROCEDURE — 97161 PT EVAL LOW COMPLEX 20 MIN: CPT | Mod: GP

## 2023-05-26 NOTE — PROGRESS NOTES
PHYSICAL THERAPY EVALUATION  Type of Visit: Evaluation    See electronic medical record for Abuse and Falls Screening details.    Subjective      Presenting condition or subjective complaint: NECK AND NECK TENDON PAIN. Drives a handicap car and drives mainly with right arm on steering wheel.   Date of onset: 23    Relevant medical history: Bladder or bowel problems; Neck injury   Dates & types of surgery:  SPINAL FUSION OF C4-5-6 AT [facility]    Prior diagnostic imaging/testing results: MRI     Prior therapy history for the same diagnosis, illness or injury: No      Living Environment  Social support: With a caregiver/helper   Type of home: Apartment/condo   Stairs to enter the home: No       Ramp: No   Stairs inside the home: No       Help at home: Self Cares (home health aide/personal care attendant, family, etc); Home management tasks (cooking, cleaning); Home and Yard maintenance tasks  Equipment owned: Power wheelchair or scooter     Employment: Yes   Hobbies/Interests: SPORTS - MOVIES - OUTDOORS    Patient goals for therapy: DRIVE, decrease pain when working on a computer    Better: TENs unit, heat    Pain assessment: Location: Right upper trap/Ratin-3/10 R upper trap, anterior shoulder. Tightness, irritating     Objective       Neuro Screen    Myotome    R L   Upper trap 5/5 5/5   Deltoid/  Supraspinatus 5/5 5/5   Biceps/wrist ex 3/5 3/5   Triceps/ wrist flex 0/5 0/5   Thumb ext/Ul. Dev NT NT   Adduction of fingers NT NT   KEY: H=Hypersensitive; h=hyposensitive; N=normal; NT=not tested      Posture/Observation:  Forward head, rounded shoulders when sitting in power chair        AROM: (Major, Moderate, Minimal or Nil loss)  Movement Loss  Pain   Flexion min +   Extension mod +   Left Rotation min    Right Rotation Mod +   Left Side Bending mod    Right Side bending mod        Palpation: Tenderness R upper trap, levator scap       Assessment & Plan   CLINICAL IMPRESSIONS    Medical Diagnosis: Neck pain    Treatment Diagnosis: Neck pain   Impression/Assessment: Patient is a 62 year old male with neck complaints.  The following significant findings have been identified: Pain, Decreased ROM/flexibility, Decreased strength, Decreased proprioception and Impaired posture. These impairments interfere with their ability to perform self care tasks, work tasks and driving  as compared to previous level of function.     Clinical Decision Making (Complexity):   Clinical Presentation: Stable/Uncomplicated  Clinical Presentation Rationale: based on medical and personal factors listed in PT evaluation  Clinical Decision Making (Complexity): Low complexity    PLAN OF CARE  Treatment Interventions:  Modalities: E-stim  Interventions: Manual Therapy, Neuromuscular Re-education, Therapeutic Activity, Therapeutic Exercise, Self-Care/Home Management    Long Term Goals     PT Goal 1  Goal Identifier: Neck  Goal Description: Patient will be able to drive without pain in neck  Rationale: to maximize safety and independence within the community;to maximize safety and independence with transportation  Target Date: 07/26/23      Frequency of Treatment: 2x/month  Duration of Treatment: 2 months    Recommended Referrals to Other Professionals: Physical Therapy  Education Assessment:        Risks and benefits of evaluation/treatment have been explained.   Patient/Family/caregiver agrees with Plan of Care.     Evaluation Time:     PT Eval, Low Complexity Minutes (21340): 15   Present: Not applicable    Signing Clinician: Kun Greenfield, PT      [facility]                                                                                   OUTPATIENT PHYSICAL THERAPY      PLAN OF TREATMENT FOR OUTPATIENT REHABILITATION   Patient's Last Name, First Name, Alex Gloria YOB: 1960   Provider's Name   Psychiatric   Medical  Record No.  [id number]     Onset Date: 11/26/22  Start of Care Date: 05/26/23     Medical Diagnosis:  Neck pain      PT Treatment Diagnosis:  Neck pain Plan of Treatment  Frequency/Duration: 2x/month/ 2 months    Certification date from   to 07/26/23         See note for plan of treatment details and functional goals     Kun Greenfield, PT                         I CERTIFY THE NEED FOR THESE SERVICES FURNISHED UNDER        THIS PLAN OF TREATMENT AND WHILE UNDER MY CARE     (Physician attestation of this document indicates review and certification of the therapy plan).                  Referring Provider:  Karina Sr      Initial Assessment  See Epic Evaluation- Start of Care Date: 05/26/23

## 2023-06-05 ENCOUNTER — TELEPHONE (OUTPATIENT)
Dept: SLEEP MEDICINE | Facility: CLINIC | Age: 63
End: 2023-06-05
Payer: MEDICARE

## 2023-06-05 ASSESSMENT — SLEEP AND FATIGUE QUESTIONNAIRES
HOW LIKELY ARE YOU TO NOD OFF OR FALL ASLEEP WHILE LYING DOWN TO REST IN THE AFTERNOON WHEN CIRCUMSTANCES PERMIT: SLIGHT CHANCE OF DOZING
HOW LIKELY ARE YOU TO NOD OFF OR FALL ASLEEP WHILE SITTING INACTIVE IN A PUBLIC PLACE: WOULD NEVER DOZE
HOW LIKELY ARE YOU TO NOD OFF OR FALL ASLEEP WHILE SITTING QUIETLY AFTER LUNCH WITHOUT ALCOHOL: WOULD NEVER DOZE
HOW LIKELY ARE YOU TO NOD OFF OR FALL ASLEEP WHILE SITTING AND TALKING TO SOMEONE: WOULD NEVER DOZE
HOW LIKELY ARE YOU TO NOD OFF OR FALL ASLEEP WHILE SITTING AND READING: WOULD NEVER DOZE
HOW LIKELY ARE YOU TO NOD OFF OR FALL ASLEEP IN A CAR, WHILE STOPPED FOR A FEW MINUTES IN TRAFFIC: WOULD NEVER DOZE
HOW LIKELY ARE YOU TO NOD OFF OR FALL ASLEEP WHEN YOU ARE A PASSENGER IN A CAR FOR AN HOUR WITHOUT A BREAK: WOULD NEVER DOZE
HOW LIKELY ARE YOU TO NOD OFF OR FALL ASLEEP WHILE WATCHING TV: SLIGHT CHANCE OF DOZING

## 2023-06-05 NOTE — TELEPHONE ENCOUNTER
Left message for patient to contact sleep clinic to schedule new patient consult, LOV was in 2019. Medicare requires patients to be seen in person for new consultations.   Deborah Akbar MA

## 2023-06-07 ENCOUNTER — OFFICE VISIT (OUTPATIENT)
Dept: SLEEP MEDICINE | Facility: CLINIC | Age: 63
End: 2023-06-07
Payer: MEDICARE

## 2023-06-07 VITALS
RESPIRATION RATE: 16 BRPM | HEART RATE: 79 BPM | DIASTOLIC BLOOD PRESSURE: 64 MMHG | SYSTOLIC BLOOD PRESSURE: 99 MMHG | OXYGEN SATURATION: 98 %

## 2023-06-07 DIAGNOSIS — J96.12 CHRONIC RESPIRATORY FAILURE WITH HYPOXIA AND HYPERCAPNIA (H): ICD-10-CM

## 2023-06-07 DIAGNOSIS — J96.11 CHRONIC RESPIRATORY FAILURE WITH HYPOXIA AND HYPERCAPNIA (H): ICD-10-CM

## 2023-06-07 DIAGNOSIS — G47.33 OSA (OBSTRUCTIVE SLEEP APNEA): Primary | ICD-10-CM

## 2023-06-07 PROCEDURE — 99204 OFFICE O/P NEW MOD 45 MIN: CPT | Performed by: INTERNAL MEDICINE

## 2023-06-07 NOTE — PROGRESS NOTES
[facility]   Outpatient Sleep Medicine Follow-up Visit  2023    Name: Alex Rodríguez MRN# [id number]   Age: 62 year old YOB: 1960     Date of Consultation: 2023  Consultation is requested by: No referring provider defined for this encounter.  Primary care provider: [facility] Jason           Assessment and Plan:     Sleep Diagnoses:    Restrictive lung disease with respiratory failure and probable obstructive sleep apnea    Comorbid conditions:    Tetraplegia     Autonomic dysfunction    Neurogenic bladder    Respiratory insufficiency    Summary Recommendations:    Orders placed for PAP supplies         History of Present Illness:      Alex Rodríguez is a 62 year old male with chronic hypoxemic respiratory failure complicating tetraplegia C4-6 with vital capacity 43% of predicted in 2019 and maximum inspiratory mouth pressure -46 currently on average volume assured ventilatory support with backup rate of 8 tidal volume of 350 and IPAP 8-14 EPAP -2023 download showed average use of 8.6 hours, AHI of less than 10 average breath rate of 12 and  tidal volume of 341.  Last reported data on [employer] device was 2023 due to modem; patient is currently using this device on a daily basis and we are awaiting his adherence documentation report.               Most Recent SCALES:    EPWORTH SLEEPINESS SCALE WITHIN 1 YEAR WITHIN 10 DAYS   Sitting and reading 0 0   Watching TV 1 1   Sitting, inactive in a public place (theatre or mtg.) 0  0    As a passenger in a car 0 0   Lying down to rest in the afternoon when circumstance permit 1 1   Sitting and talking to someone 0 0   Sitting quietly after lunch without alcohol 0 0   In a car, while stopped for a few minutes in traffic 0 0   TOTAL SCORE 2 2   Normal < 11         0--none    1--mild    2--moderate  3--severe      INSOMNIA SEVERITY INDEX WITHIN 1 YEAR   Difficulty falling  asleep 0   Difficult staying asleep 1   Problems waking up to early 1   How SATISFIED/DISSATISFIED are you with your CURRENT sleep pattern? 0   How NOTICEABLE to others do you think your sleep pattern is in terms of your quality of life? 0   How WORRIED/DISTRESSED are you about your current sleep pattern? 0   To what extent do you consider your sleep problem to INTERFERE with your daily fuctioning(e.g. daytime fatigue, mood, ability to function at work/daily chores, concentration, mood,etc.) CURRENTLY? 0   INSOMNIA SEVERITY INDEX TOTAL SCORE 2    --absence of insomnia (0-7); sub-threshold insomnia (8-14); moderate insomnia (15-21); and severe insomnia (22-28)--                     Medications:     Current Outpatient Medications   Medication Sig     COMPRESSION STOCKINGS 1 each daily Compression stockings, knee high to be worn during days, ok off at night. Medium 20-30 mmHg compression. 3 pair. 6 refills.     glycopyrrolate (ROBINUL) 1 MG tablet Take 1 mg by mouth 4 times daily     levofloxacin (LEVAQUIN) 500 MG tablet Take 1 tablet (500 mg) by mouth daily     multivitamin, therapeutic with minerals (THERA-VIT-M) TABS Take 1 tablet by mouth daily     nitroFURantoin macrocrystal-monohydrate (MACROBID) 100 MG capsule Take 1 capsule (100 mg) by mouth 2 times daily     omeprazole (PRILOSEC) 20 MG DR capsule Take 20 mg by mouth daily     psyllium (METAMUCIL) 58.6 % packet Take 1 packet by mouth 2 times daily     simethicone (MYLICON) 125 MG chewable tablet Take 1 tablet (125 mg) by mouth 4 times daily as needed for intestinal gas     sulfamethoxazole-trimethoprim (BACTRIM/SEPTRA) 400-80 MG tablet Take 1 tablet by mouth daily     tamsulosin (FLOMAX) 0.4 MG capsule Take 1 capsule (0.4 mg) by mouth daily     TERAZOSIN HCL PO Take 5 mg by mouth daily.     No current facility-administered medications for this visit.        Allergies   Allergen Reactions     Amoxicillin Nausea and Vomiting     Amoxicillin-Pot Clavulanate Nausea  and Vomiting     Clavulanic Acid Nausea and Vomiting            Problem List:     Patient Active Problem List   Diagnosis     Shoulder pain     Impingement syndrome, shoulder     UTI (urinary tract infection)     Neurogenic bladder     Autonomic dysfunction/dysregulation, possibly dysreflexia     Tetraplegia (H)     Neck pain            Past Medical History:     Does not need 02 supplement at night   Past Medical History:   Diagnosis Date     History of spinal cord injury 06/28/1980     Neuromuscular disorder (H)      Unspecified site of spinal cord injury without evidence of spinal bone injury     C 5-6             Past Surgical History:    No h/o  upper airway surgery  Past Surgical History:   Procedure Laterality Date     BACK SURGERY       COLONOSCOPY N/A 6/11/2021    Procedure: COLONOSCOPY;  Surgeon: Jay Adame MD;  Location:  GI     ESOPHAGOSCOPY, GASTROSCOPY, DUODENOSCOPY (EGD), COMBINED N/A 6/11/2021    Procedure: ESOPHAGOGASTRODUODENOSCOPY, WITH BIOPSY;  Surgeon: Jay Adame MD;  Location:  GI     GENITOURINARY SURGERY       SOFT TISSUE SURGERY                Physical Examination:     Reported vitals:  There were no vitals taken for this visit.   GENERAL: Healthy, alert and no distress  EYES: Eyes grossly normal to inspection.  No discharge or erythema, or obvious scleral/conjunctival abnormalities.  RESP: No audible wheeze, cough, or visible cyanosis.  No visible retractions or increased work of breathing.    SKIN: Visible skin clear. No significant rash, abnormal pigmentation or lesions.  NEURO: Cranial nerves grossly intact.  Mentation and speech appropriate for age.  PSYCH: Mentation appears normal, affect normal/bright, judgement and insight intact, normal speech and appearance well-groomed.        Copy to: [employer] [facility] JUAN Gomez MD 6/7/2023         Total time spent reviewing medical records including previous testing and interpretation as well as direct  patient contact and documentation on this date: 30 min

## 2023-06-07 NOTE — Clinical Note
Patient needs to have his modem registered so we can access [employer] for downloads and integrate into his medical record He has a DreamStation 2 replaced device from [email or web address].

## 2023-06-08 NOTE — NURSING NOTE
Called pt and LVM requesting his serial number to attempt to add his device. Since it is a DS1, it may be without a modem even though it was replaced

## 2023-06-19 ENCOUNTER — DOCUMENTATION ONLY (OUTPATIENT)
Dept: RESPIRATORY THERAPY | Facility: CLINIC | Age: 63
End: 2023-06-19
Payer: MEDICARE

## 2023-06-19 NOTE — PROGRESS NOTES
I SPOKE  WITH THE PATIENT AND LET HIM KNOW THE NEW MODEM IS WORKING, THAT I CAN SEE HIS LATEST DATA. HE ASKED HOW THE  SLEEP DOCTORS WOULD KNOW AND I SAID I'D PUT A NOTE IN HIS Epic CHART AND ALSO FAX A COPY OVER TO JOVAN AT [facility] [place].

## 2023-06-23 ENCOUNTER — TELEPHONE (OUTPATIENT)
Dept: SLEEP MEDICINE | Facility: CLINIC | Age: 63
End: 2023-06-23
Payer: MEDICARE

## 2023-06-23 NOTE — TELEPHONE ENCOUNTER
Avap download rev'd from [facility] and have been scanned into chart.     Message will be sent to provider for AVAP Download review.      DIEGO Agustin

## 2023-07-25 ENCOUNTER — TRANSFERRED RECORDS (OUTPATIENT)
Dept: HEALTH INFORMATION MANAGEMENT | Facility: CLINIC | Age: 63
End: 2023-07-25
Payer: MEDICARE

## 2023-07-25 LAB
ALT SERPL-CCNC: 14 U/L (ref 16–63)
AST SERPL-CCNC: 19 U/L (ref 0–55)
CREATININE (EXTERNAL): 0.39 MG/DL (ref 0.7–1.3)
GFR ESTIMATED (EXTERNAL): >60 ML/MIN/1.73M2
GLUCOSE (EXTERNAL): 93 MG/DL (ref 70–124)
POTASSIUM (EXTERNAL): 4.1 MMOL/L (ref 3.4–5.3)

## 2023-08-07 ENCOUNTER — MYC MEDICAL ADVICE (OUTPATIENT)
Dept: UROLOGY | Facility: CLINIC | Age: 63
End: 2023-08-07
Payer: MEDICARE

## 2023-08-10 ENCOUNTER — MEDICAL CORRESPONDENCE (OUTPATIENT)
Dept: HEALTH INFORMATION MANAGEMENT | Facility: CLINIC | Age: 63
End: 2023-08-10
Payer: MEDICARE

## 2023-08-10 NOTE — TELEPHONE ENCOUNTER
Patient would like to change providers to see Dr. Walker for prostatitis.  Per patient, he will also have his PCP send a referral for dx to be seen with Dr. Walker. Patient has been advised that Dr. Walker does not see patients at [facility] for this dx, but will see patients in person in  for dx.  Please advise.

## 2023-08-11 NOTE — TELEPHONE ENCOUNTER
Spoke with pt, he requests to see Dr. Walker, advised him Dr. Walker is booked into December. Pt stated he needs to speak with PCP because he did not think he would have to wait that long. He would prefer not to see an JOSE ALBERTO

## 2023-08-15 ENCOUNTER — TRANSCRIBE ORDERS (OUTPATIENT)
Dept: OTHER | Age: 63
End: 2023-08-15

## 2023-08-15 DIAGNOSIS — N41.9 PROSTATITIS, UNSPECIFIED PROSTATITIS TYPE: Primary | ICD-10-CM

## 2023-08-31 ENCOUNTER — ANCILLARY PROCEDURE (OUTPATIENT)
Dept: CT IMAGING | Facility: CLINIC | Age: 63
End: 2023-08-31
Attending: FAMILY MEDICINE
Payer: MEDICARE

## 2023-08-31 DIAGNOSIS — R10.30 LOWER ABDOMINAL PAIN: ICD-10-CM

## 2023-08-31 DIAGNOSIS — K62.89 RECTAL PAIN: ICD-10-CM

## 2023-08-31 PROCEDURE — 74177 CT ABD & PELVIS W/CONTRAST: CPT | Mod: GC | Performed by: RADIOLOGY

## 2023-08-31 RX ORDER — IOPAMIDOL 755 MG/ML
81 INJECTION, SOLUTION INTRAVASCULAR ONCE
Status: COMPLETED | OUTPATIENT
Start: 2023-08-31 | End: 2023-08-31

## 2023-08-31 RX ADMIN — IOPAMIDOL 81 ML: 755 INJECTION, SOLUTION INTRAVASCULAR at 15:36

## 2023-10-22 DIAGNOSIS — N39.0 URINARY TRACT INFECTION WITHOUT HEMATURIA, SITE UNSPECIFIED: ICD-10-CM

## 2023-10-24 RX ORDER — NITROFURANTOIN 25; 75 MG/1; MG/1
100 CAPSULE ORAL 2 TIMES DAILY
Qty: 90 CAPSULE | Refills: 0 | Status: SHIPPED | OUTPATIENT
Start: 2023-10-24

## 2024-01-09 ENCOUNTER — MYC MEDICAL ADVICE (OUTPATIENT)
Dept: UROLOGY | Facility: CLINIC | Age: 64
End: 2024-01-09
Payer: MEDICARE

## 2024-01-10 ENCOUNTER — OFFICE VISIT (OUTPATIENT)
Dept: UROLOGY | Facility: CLINIC | Age: 64
End: 2024-01-10
Attending: UROLOGY
Payer: MEDICARE

## 2024-01-10 DIAGNOSIS — N39.0 URINARY TRACT INFECTION WITHOUT HEMATURIA, SITE UNSPECIFIED: Primary | ICD-10-CM

## 2024-01-10 DIAGNOSIS — N41.9 PROSTATITIS, UNSPECIFIED PROSTATITIS TYPE: ICD-10-CM

## 2024-01-10 DIAGNOSIS — N31.9 NEUROGENIC BLADDER: ICD-10-CM

## 2024-01-10 PROCEDURE — 99214 OFFICE O/P EST MOD 30 MIN: CPT | Mod: 25 | Performed by: UROLOGY

## 2024-01-10 PROCEDURE — 51798 US URINE CAPACITY MEASURE: CPT | Performed by: UROLOGY

## 2024-01-10 NOTE — NURSING NOTE
Alex Rodríguez's goals for this visit include:   Chief Complaint   Patient presents with    RECHECK     Prostatitis       He requests these members of his care team be copied on today's visit information:     [facility]    Referring Provider:  Karina Sr MD  [address] [address]    There were no vitals taken for this visit.    Do you need any medication refills at today's visit?     Yasmin Whitfield MA on 1/10/2024 at 2:58 PM
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
## 2024-02-20 DIAGNOSIS — G47.33 OBSTRUCTIVE SLEEP APNEA (ADULT) (PEDIATRIC): Primary | ICD-10-CM

## 2024-06-02 ENCOUNTER — HEALTH MAINTENANCE LETTER (OUTPATIENT)
Age: 64
End: 2024-06-02

## 2024-07-10 DIAGNOSIS — N31.9 NEUROGENIC BLADDER: Primary | ICD-10-CM

## 2024-07-10 DIAGNOSIS — N39.0 URINARY TRACT INFECTION WITHOUT HEMATURIA, SITE UNSPECIFIED: Primary | ICD-10-CM

## 2024-07-10 DIAGNOSIS — N41.9 PROSTATITIS, UNSPECIFIED PROSTATITIS TYPE: ICD-10-CM

## 2024-07-10 RX ORDER — LEVOFLOXACIN 500 MG/1
500 TABLET, FILM COATED ORAL DAILY
Qty: 60 TABLET | Refills: 0 | Status: SHIPPED | OUTPATIENT
Start: 2024-07-10

## 2024-07-20 ENCOUNTER — MYC MEDICAL ADVICE (OUTPATIENT)
Dept: UROLOGY | Facility: CLINIC | Age: 64
End: 2024-07-20
Payer: MEDICARE

## 2024-07-23 NOTE — TELEPHONE ENCOUNTER
If patient returns MyChart message - put 8/14 2:45 pm on hold for now on Aaron's schedule.     Yasmin Whitfield MA on 7/23/2024 at 8:32 AM

## 2024-07-23 NOTE — TELEPHONE ENCOUNTER
Estevan Walker MD  You15 hours ago (5:01 PM)      I am happy to see him back.    Received above message from provider.  Yasmin Whitfield MA on 7/23/2024 at 8:29 AM

## 2024-08-28 ENCOUNTER — VIRTUAL VISIT (OUTPATIENT)
Dept: UROLOGY | Facility: CLINIC | Age: 64
End: 2024-08-28
Payer: MEDICARE

## 2024-08-28 DIAGNOSIS — N39.0 URINARY TRACT INFECTION WITHOUT HEMATURIA, SITE UNSPECIFIED: Primary | ICD-10-CM

## 2024-08-28 PROCEDURE — 99214 OFFICE O/P EST MOD 30 MIN: CPT | Mod: 95 | Performed by: UROLOGY

## 2024-08-28 NOTE — LETTER
8/28/2024      RE: Alex Rodríguez  [address]  [facility] [address]       Virtual Visit Details    Type of service:  Video Visit           Originating Location (pt. Location): Home    Distant Location (provider location):  On-site  Platform used for Video Visit: Rachell    Levaquin 500mg 2mo course started July 2024.  He can go 2-5 weeks off antibiotics before developing UTI/ prostatitis.    Has tried early self start of Levaquin to stave off prostatitis symptoms.  Has been on levaquin, nitrofurantoin for long-term prevention.    History of constipation, working on bowel regimen to keep cleared out.  Remote history of autonomic dysreflexia, doesn't get this recently.  Takes glycopyrolate to prevent sweating symptoms.    Exam  General- Alert, oriented, nad.  Pleasant and conversant.  Eyes- anicteric, EOMI.  Resps- normal, non-labored.  No cough  Abdomen-  nondistended.   exam- deferred.   Neurological - no tremors  Skin - no discoloration/ lesions noted  Psychiatric - no anxiety, alert & oriented.      The rest of a comprehensive physical examination is deferred due to video visit       Last urine culture I see.  This is remote.  He has a history of a poly resistant Pseudomonas, which may be a colonizer.        CT abdomen/pelvis 8/31/23    IMPRESSION:   1. Ongoing urothelial enhancement of the penile urethra and  periurethral soft tissue enhancement, which are nonspecific and may  indicate chronic inflammation. Urethra is dilated throughout its  length to the meatus. Correlation with any known stricturing and  micturition issues suggested.  2. Small pericardial effusion, decreased compared to 11/12/2020.   3. Moderate to large colonic stool burden with gaseous distention of  the sigmoid and transverse colon in a nonobstructive pattern.      PVR 40cc 1/10/24, emptying the bladder very well    Creatinine 0.39 7/25/2023      Assessment/PLAN:   He is wondering about ongoing antibiotic management  for recurring UTI symptoms/chronic prostatitis symptoms.  Symptoms generally resolve if he is on Levaquin, but at most he can go 2 to 5 weeks off the antibiotic before resolution of urinary UTI symptoms.  He is wondering if a different antibiotic would be better than Levaquin and we discussed long-term resistance to antibiotics is possible.  I suggested getting a urine culture, to objectively guide antibiotic therapy.  He has had some problems in the past with the culture getting canceled for suspected contamination.  I think the urine should be cultured no matter the apparent debris in it, the sample may not be perfect from a condom catheter, but it would be helpful to have at least some objective evidence to go off of to guide antibiotic selection.  The urine could be collected from a fresh condom catheter.  It is not really possible for him to do a voided sample as he has a history of sphincterotomy.  The urine should be cultured no matter what.  We would asked that the culture not be canceled by lab discretion.    We checked a PVR previously via bladder scan, this showed 40 mL.  I reviewed his previous urodynamic studies from a decade ago, which showed low risk bladder, and a PVR of questional accuracy of 158 mL at that time.      There is no evidence of hydronephrosis.  Repeat renal imaging via ultrasound is reasonable to do roughly once a year, to help exclude hydronephrosis and renal stones.  Creatinine has been normal, there is no hydronephrosis on August 2023 CT scan, so continued surveillance yearly imaging is all that is indicated right now.  If he has continued issues related to neurogenic bladder, encouraged to follow-up with Dr. Prudencio Milian, a very experienced provider in this area of urology. I'm happy to see him back in the future as needed.       --------------------------------------------------------------------------------------------------------------------   Additional Coding  Information:    Problems:  3 -- one stable chronic illness    Data Reviewed  3 or more studies reviewed, as listed above     Tests ordered/pending: UA     Level of risk:  4 -- prescription drug management (discussed)    Time spent:  28 minutes spent on the date of the encounter doing chart review, history and exam, documentation and further activities per the note                  Estevan Walker MD

## 2024-08-28 NOTE — NURSING NOTE
Current patient location: [address]  [facility] [address]    Is the patient currently in the state of [place]? YES    Visit mode:VIDEO    If the visit is dropped, the patient can be reconnected by: VIDEO VISIT: Text to cell phone:   Telephone Information:   Mobile [phone]       Will anyone else be joining the visit? NO  (If patient encounters technical issues they should call [phone]    How would you like to obtain your AVS? MyChart    Are changes needed to the allergy or medication list? No, Pt stated no changes to allergies, and Pt stated no med changes    Are refills needed on medications prescribed by this physician? NO    Rooming Documentation:  Not applicable      Reason for visit: RECHECK    Ivette HILTON

## 2024-08-28 NOTE — PROGRESS NOTES
Virtual Visit Details    Type of service:  Video Visit           Originating Location (pt. Location): Home    Distant Location (provider location):  On-site  Platform used for Video Visit: Rachell    Levaquin 500mg 2mo course started July 2024.  He can go 2-5 weeks off antibiotics before developing UTI/ prostatitis.    Has tried early self start of Levaquin to stave off prostatitis symptoms.  Has been on levaquin, nitrofurantoin for long-term prevention.    History of constipation, working on bowel regimen to keep cleared out.  Remote history of autonomic dysreflexia, doesn't get this recently.  Takes glycopyrolate to prevent sweating symptoms.    Exam  General- Alert, oriented, nad.  Pleasant and conversant.  Eyes- anicteric, EOMI.  Resps- normal, non-labored.  No cough  Abdomen-  nondistended.   exam- deferred.   Neurological - no tremors  Skin - no discoloration/ lesions noted  Psychiatric - no anxiety, alert & oriented.      The rest of a comprehensive physical examination is deferred due to video visit       Last urine culture I see.  This is remote.  He has a history of a poly resistant Pseudomonas, which may be a colonizer.        CT abdomen/pelvis 8/31/23    IMPRESSION:   1. Ongoing urothelial enhancement of the penile urethra and  periurethral soft tissue enhancement, which are nonspecific and may  indicate chronic inflammation. Urethra is dilated throughout its  length to the meatus. Correlation with any known stricturing and  micturition issues suggested.  2. Small pericardial effusion, decreased compared to 11/12/2020.   3. Moderate to large colonic stool burden with gaseous distention of  the sigmoid and transverse colon in a nonobstructive pattern.      PVR 40cc 1/10/24, emptying the bladder very well    Creatinine 0.39 7/25/2023      Assessment/PLAN:   He is wondering about ongoing antibiotic management for recurring UTI symptoms/chronic prostatitis symptoms.  Symptoms generally resolve if he is on  Levaquin, but at most he can go 2 to 5 weeks off the antibiotic before resolution of urinary UTI symptoms.  He is wondering if a different antibiotic would be better than Levaquin and we discussed long-term resistance to antibiotics is possible.  I suggested getting a urine culture, to objectively guide antibiotic therapy.  He has had some problems in the past with the culture getting canceled for suspected contamination.  I think the urine should be cultured no matter the apparent debris in it, the sample may not be perfect from a condom catheter, but it would be helpful to have at least some objective evidence to go off of to guide antibiotic selection.  The urine could be collected from a fresh condom catheter.  It is not really possible for him to do a voided sample as he has a history of sphincterotomy.  The urine should be cultured no matter what.  We would asked that the culture not be canceled by lab discretion.    We checked a PVR previously via bladder scan, this showed 40 mL.  I reviewed his previous urodynamic studies from a decade ago, which showed low risk bladder, and a PVR of questional accuracy of 158 mL at that time.      There is no evidence of hydronephrosis.  Repeat renal imaging via ultrasound is reasonable to do roughly once a year, to help exclude hydronephrosis and renal stones.  Creatinine has been normal, there is no hydronephrosis on August 2023 CT scan, so continued surveillance yearly imaging is all that is indicated right now.  If he has continued issues related to neurogenic bladder, encouraged to follow-up with Dr. Prudencio Milian, a very experienced provider in this area of urology. I'm happy to see him back in the future as needed.       --------------------------------------------------------------------------------------------------------------------   Additional Coding Information:    Problems:  3 -- one stable chronic illness    Data Reviewed  3 or more studies reviewed, as listed  above     Tests ordered/pending: UA     Level of risk:  4 -- prescription drug management (discussed)    Time spent:  28 minutes spent on the date of the encounter doing chart review, history and exam, documentation and further activities per the note

## 2024-09-11 ENCOUNTER — TELEPHONE (OUTPATIENT)
Dept: SLEEP MEDICINE | Facility: CLINIC | Age: 64
End: 2024-09-11
Payer: MEDICARE

## 2024-09-11 NOTE — TELEPHONE ENCOUNTER
Reason for Call:  Appointment Request    Patient requesting this type of appt:  return visit with Dr Martinez, 5 year replacement machine, would like this done by video    Requested provider:  Dr Martinez    Reason patient unable to be scheduled: Not within requested timeframe    When does patient want to be seen/preferred time:  as soon as  possible     Comments: call patient     Could we send this information to you in [employer] or would you prefer to receive a phone call?:   Patient would prefer a phone call   Okay to leave a detailed message?: Yes at Cell number on file:    Telephone Information:   Mobile [phone]       Call taken on 9/11/2024 at 12:31 PM by Noni French

## 2025-02-26 ENCOUNTER — DOCUMENTATION ONLY (OUTPATIENT)
Dept: SLEEP MEDICINE | Facility: CLINIC | Age: 65
End: 2025-02-26
Payer: MEDICARE

## 2025-02-26 DIAGNOSIS — G47.33 OBSTRUCTIVE SLEEP APNEA (ADULT) (PEDIATRIC): Primary | ICD-10-CM

## 2025-05-08 ASSESSMENT — SLEEP AND FATIGUE QUESTIONNAIRES
HOW LIKELY ARE YOU TO NOD OFF OR FALL ASLEEP WHILE SITTING QUIETLY AFTER LUNCH WITHOUT ALCOHOL: WOULD NEVER DOZE
HOW LIKELY ARE YOU TO NOD OFF OR FALL ASLEEP WHILE SITTING AND TALKING TO SOMEONE: WOULD NEVER DOZE
HOW LIKELY ARE YOU TO NOD OFF OR FALL ASLEEP WHILE LYING DOWN TO REST IN THE AFTERNOON WHEN CIRCUMSTANCES PERMIT: SLIGHT CHANCE OF DOZING
HOW LIKELY ARE YOU TO NOD OFF OR FALL ASLEEP WHILE SITTING AND READING: WOULD NEVER DOZE
HOW LIKELY ARE YOU TO NOD OFF OR FALL ASLEEP WHEN YOU ARE A PASSENGER IN A CAR FOR AN HOUR WITHOUT A BREAK: WOULD NEVER DOZE
HOW LIKELY ARE YOU TO NOD OFF OR FALL ASLEEP WHILE SITTING INACTIVE IN A PUBLIC PLACE: WOULD NEVER DOZE
HOW LIKELY ARE YOU TO NOD OFF OR FALL ASLEEP WHILE WATCHING TV: SLIGHT CHANCE OF DOZING
HOW LIKELY ARE YOU TO NOD OFF OR FALL ASLEEP IN A CAR, WHILE STOPPED FOR A FEW MINUTES IN TRAFFIC: WOULD NEVER DOZE

## 2025-05-13 ENCOUNTER — OFFICE VISIT (OUTPATIENT)
Dept: SLEEP MEDICINE | Facility: CLINIC | Age: 65
End: 2025-05-13
Payer: MEDICARE

## 2025-05-13 VITALS
OXYGEN SATURATION: 96 % | HEART RATE: 80 BPM | HEIGHT: 70 IN | SYSTOLIC BLOOD PRESSURE: 92 MMHG | DIASTOLIC BLOOD PRESSURE: 67 MMHG | BODY MASS INDEX: 20.81 KG/M2

## 2025-05-13 DIAGNOSIS — J96.11 HYPOXEMIC RESPIRATORY FAILURE, CHRONIC (H): ICD-10-CM

## 2025-05-13 DIAGNOSIS — G82.50 TETRAPLEGIA (H): ICD-10-CM

## 2025-05-13 DIAGNOSIS — J98.4 CHRONIC RESTRICTIVE LUNG DISEASE: Primary | ICD-10-CM

## 2025-05-13 PROCEDURE — 99214 OFFICE O/P EST MOD 30 MIN: CPT | Performed by: INTERNAL MEDICINE

## 2025-05-13 PROCEDURE — 3078F DIAST BP <80 MM HG: CPT | Performed by: INTERNAL MEDICINE

## 2025-05-13 PROCEDURE — 3074F SYST BP LT 130 MM HG: CPT | Performed by: INTERNAL MEDICINE

## 2025-05-13 PROCEDURE — 1126F AMNT PAIN NOTED NONE PRSNT: CPT | Performed by: INTERNAL MEDICINE

## 2025-05-13 NOTE — NURSING NOTE
"Chief Complaint   Patient presents with    CPAP Follow Up       Initial BP 92/67   Pulse 80   Ht 1.778 m (5' 10\")   SpO2 96%   BMI 20.81 kg/m   Estimated body mass index is 20.81 kg/m  as calculated from the following:    Height as of this encounter: 1.778 m (5' 10\").    Weight as of 10/25/22: 65.8 kg (145 lb).    Medication Reconciliation: complete    Neck circumference:  40 centimeters.    Keli Ruth on 5/13/2025       "

## 2025-05-13 NOTE — PROGRESS NOTES
[facility]   Outpatient Sleep Medicine Follow-up Visit  2023    Name: Alex Rodríguez MRN# [id number]   Age: 64 year old YOB: 1960     Date of Consultation: 2025  Consultation is requested by: No referring provider defined for this encounter.  Primary care provider: [facility]           Assessment and Plan:     Sleep Diagnoses:  Restrictive lung disease with respiratory failure and probable obstructive sleep apnea  On AVAPs --- EPAP 6, IPAP min- max 8-14, RR 8, TV 350ml    Comorbid conditions:  Tetraplegia   Autonomic dysfunction  Neurogenic bladder  Respiratory insufficiency    Summary Recommendations:  Orders placed for PAP supplies  Change BUR to 10 form 8 as DL suggests average rate of 12.   DME order, [facility], placed for new iVAPS         History of Present Illness:      Alex Rodríguez is a 62 year old male with chronic hypoxemic respiratory failure complicating tetraplegia C4-6 with vital capacity 43% of predicted in 2019 and maximum inspiratory mouth pressure -46 currently on average volume assured ventilatory support with setting mentioned above.    Download from 2025-2025  100 % usage, >4 hrs 100%  average use of 8 hours 40 min,   average breath rate of 12    tidal volume of 341.      Last visit in 2023.  Today reports no concerns with machine or usage. Tolerating pressures well. Reports rare events of getting too much air.   No breakthrough choking/gasping for air.   No xerostomia, aerophagia, nasal congestion/rhinorrhea.   Has supplies.     No hospitalizations since.  recent colds/URIs. No concerns for cough, wheezing, sputum, shortness of breath.                Most Recent SCALES:    EPWORTH SLEEPINESS SCALE WITHIN 1 YEAR WITHIN 10 DAYS   Sitting and reading 0 0   Watching TV 1 1   Sitting, inactive in a public place (theatre or mtg.) 0  0    As a passenger in a car 0 0   Lying down to rest in the  afternoon when circumstance permit 1 1   Sitting and talking to someone 0 0   Sitting quietly after lunch without alcohol 0 0   In a car, while stopped for a few minutes in traffic 0 0   TOTAL SCORE 2 2   Normal < 11         0--none    1--mild    2--moderate  3--severe      INSOMNIA SEVERITY INDEX WITHIN 1 YEAR   Difficulty falling asleep 0   Difficult staying asleep 1   Problems waking up to early 1   How SATISFIED/DISSATISFIED are you with your CURRENT sleep pattern? 0   How NOTICEABLE to others do you think your sleep pattern is in terms of your quality of life? 0   How WORRIED/DISTRESSED are you about your current sleep pattern? 0   To what extent do you consider your sleep problem to INTERFERE with your daily fuctioning(e.g. daytime fatigue, mood, ability to function at work/daily chores, concentration, mood,etc.) CURRENTLY? 0   INSOMNIA SEVERITY INDEX TOTAL SCORE 2    --absence of insomnia (0-7); sub-threshold insomnia (8-14); moderate insomnia (15-21); and severe insomnia (22-28)--                     Medications:     Current Outpatient Medications   Medication Sig Dispense Refill    COMPRESSION STOCKINGS 1 each daily Compression stockings, knee high to be worn during days, ok off at night. Medium 20-30 mmHg compression. 3 pair. 6 refills. 3 each 6    glycopyrrolate (ROBINUL) 1 MG tablet Take 1 mg by mouth 4 times daily      levofloxacin (LEVAQUIN) 500 MG tablet Take 1 tablet (500 mg) by mouth daily 60 tablet 0    nitroFURantoin macrocrystal-monohydrate (MACROBID) 100 MG capsule Take 1 capsule by mouth twice daily. 90 capsule 0    omeprazole (PRILOSEC) 20 MG DR capsule Take 20 mg by mouth daily      psyllium (METAMUCIL) 58.6 % packet Take 1 packet by mouth 2 times daily      tamsulosin (FLOMAX) 0.4 MG capsule Take 1 capsule (0.4 mg) by mouth daily 30 capsule 0     No current facility-administered medications for this visit.        Allergies   Allergen Reactions    Amoxicillin Nausea and Vomiting     Amoxicillin-Pot Clavulanate Nausea and Vomiting    Clavulanic Acid Nausea and Vomiting            Problem List:     Patient Active Problem List   Diagnosis    Shoulder pain    Impingement syndrome, shoulder    UTI (urinary tract infection)    Neurogenic bladder    Autonomic dysfunction/dysregulation, possibly dysreflexia    Tetraplegia (H)    Neck pain            Past Medical History:     Does not need 02 supplement at night   Past Medical History:   Diagnosis Date    History of spinal cord injury 06/28/1980    Neuromuscular disorder (H)     Unspecified site of spinal cord injury without evidence of spinal bone injury     C 5-6             Past Surgical History:    No h/o  upper airway surgery  Past Surgical History:   Procedure Laterality Date    BACK SURGERY      COLONOSCOPY N/A 6/11/2021    Procedure: COLONOSCOPY;  Surgeon: Jay Adame MD;  Location:  GI    ESOPHAGOSCOPY, GASTROSCOPY, DUODENOSCOPY (EGD), COMBINED N/A 6/11/2021    Procedure: ESOPHAGOGASTRODUODENOSCOPY, WITH BIOPSY;  Surgeon: Jay Adame MD;  Location:  GI    GENITOURINARY SURGERY      SOFT TISSUE SURGERY                Physical Examination:     Reported vitals:  There were no vitals taken for this visit.   GENERAL: Healthy, alert and no distress  EYES: Eyes grossly normal to inspection.  No discharge or erythema, or obvious scleral/conjunctival abnormalities.  RESP: No audible wheeze, cough, or visible cyanosis.  No visible retractions or increased work of breathing.    SKIN: Visible skin clear. No significant rash, abnormal pigmentation or lesions.  NEURO: Cranial nerves grossly intact.  Mentation and speech appropriate for age.  PSYCH: Mentation appears normal, affect normal/bright, judgement and insight intact, normal speech and appearance well-groomed.        Copy to: [facility]     This patient was examined and evaluated jointly with Dr. Kumar     Total time reviewing previous  testing, medications, record review and preparation, 30 min  Assessment and plan were developed jointly and discussed with the patient during this visit.   JUAN ARIAS MD  [facility]  Department of Medicine     Total time spent reviewing medical records including previous testing and interpretation as well as direct patient contact and documentation on this date: 30 min

## 2025-05-13 NOTE — PROCEDURES
[facility]   Outpatient Sleep Medicine Follow-up Visit  May 13, 2025    Name: Alex Rodríguez MRN# [id number]   Age: 64 year old YOB: 1960     Date of Consultation: May 13, 2025  Consultation is requested by: No referring provider defined for this encounter.  Primary care provider: [facility]           Assessment and Plan:     Sleep Diagnoses:  Chronic    Comorbid conditions:  C4-6 tetraplegia with vital capacity 43% predicted    Summary Recommendations:  ***  No orders of the defined types were placed in this encounter.      Summary Counseling:  New sleep schedule recommendation: ***         History of Present Illness:     Alex Rodríguez is a 64 year old male with severe respiratory muscle weakness complicating T4-6 tetraplegia currently on average volume assured ventilatory support with a device that may not be supported after      SLEEP-WAKE SCHEDULE: ***  Workday bedtime ***  Awakening ***  Using alarm ***  Nonworkday bedtime *** Awakening ***   Awakenings for *** minutes ***/week  Naps ***    Average volume assured ventilatory support 4/7 to 5/6/2025  Settings:  EPAP 6  IPAP 8-14  Tidal volume 350  Rate 8  Rise time 4    Monitoring  Average use 8.6 hours  Use 100% of days  Average IPAP 12  EPAP 6  Breath rate 12 (11-13)  Triggered best 90%      Reduced vital capacity 0.6 L or 13% predicted 2019  Respiratory muscle weakness 2019       Most Recent SCALES:    EPWORTH SLEEPINESS SCALE WITHIN 1 YEAR WITHIN 10 DAYS   Sitting and reading (Patient-Rptd) 0 (Patient-Rptd) 0   Watching TV (Patient-Rptd) 1 (Patient-Rptd) 1   Sitting, inactive in a public place (theatre or mtg.) (Patient-Rptd) 0  (Patient-Rptd) 0    As a passenger in a car (Patient-Rptd) 0 (Patient-Rptd) 0   Lying down to rest in the afternoon when circumstance permit (Patient-Rptd) 1 (Patient-Rptd) 1   Sitting and talking to someone (Patient-Rptd) 0 (Patient-Rptd) 0   Sitting quietly after  lunch without alcohol (Patient-Rptd) 0 (Patient-Rptd) 0   In a car, while stopped for a few minutes in traffic (Patient-Rptd) 0 (Patient-Rptd) 0   TOTAL SCORE (Patient-Rptd) 2 (Patient-Rptd) 2   Normal < 11         0--none    1--mild    2--moderate  3--severe      INSOMNIA SEVERITY INDEX WITHIN 1 YEAR   Difficulty falling asleep (Patient-Rptd) 0   Difficult staying asleep (Patient-Rptd) 0   Problems waking up to early (Patient-Rptd) 1   How SATISFIED/DISSATISFIED are you with your CURRENT sleep pattern? (Patient-Rptd) 0   How NOTICEABLE to others do you think your sleep pattern is in terms of your quality of life? (Patient-Rptd) 0   How WORRIED/DISTRESSED are you about your current sleep pattern? (Patient-Rptd) 0   To what extent do you consider your sleep problem to INTERFERE with your daily fuctioning(e.g. daytime fatigue, mood, ability to function at work/daily chores, concentration, mood,etc.) CURRENTLY? (Patient-Rptd) 0   INSOMNIA SEVERITY INDEX TOTAL SCORE (Patient-Rptd) 1    --absence of insomnia (0-7); sub-threshold insomnia (8-14); moderate insomnia (15-21); and severe insomnia (22-28)--       Respironics    Auto-PAP *** - *** cmH2O 30 day usage data:    100% of days with > 4 hours of use. 0/30 days with no use.   Average use 526 minutes per day.   Average leak 35.03 LPM.  Average % of night in large leak 0%.    CPAP 90% pressure 6cm.   AHI 6.78 events per hour.                    Medications:     Current Outpatient Medications   Medication Sig Dispense Refill    COMPRESSION STOCKINGS 1 each daily Compression stockings, knee high to be worn during days, ok off at night. Medium 20-30 mmHg compression. 3 pair. 6 refills. 3 each 6    glycopyrrolate (ROBINUL) 1 MG tablet Take 1 mg by mouth 4 times daily      levofloxacin (LEVAQUIN) 500 MG tablet Take 1 tablet (500 mg) by mouth daily 60 tablet 0    nitroFURantoin macrocrystal-monohydrate (MACROBID) 100 MG capsule Take 1 capsule by mouth twice daily. 90 capsule 0     omeprazole (PRILOSEC) 20 MG DR capsule Take 20 mg by mouth daily      psyllium (METAMUCIL) 58.6 % packet Take 1 packet by mouth 2 times daily      tamsulosin (FLOMAX) 0.4 MG capsule Take 1 capsule (0.4 mg) by mouth daily 30 capsule 0     No current facility-administered medications for this visit.        Allergies   Allergen Reactions    Amoxicillin Nausea and Vomiting    Amoxicillin-Pot Clavulanate Nausea and Vomiting    Clavulanic Acid Nausea and Vomiting            Problem List:     Patient Active Problem List   Diagnosis    Shoulder pain    Impingement syndrome, shoulder    UTI (urinary tract infection)    Neurogenic bladder    Autonomic dysfunction/dysregulation, possibly dysreflexia    Tetraplegia (H)    Neck pain            Past Medical History:     Does not need 02 supplement at night   Past Medical History:   Diagnosis Date    History of spinal cord injury 06/28/1980    Neuromuscular disorder (H)     Unspecified site of spinal cord injury without evidence of spinal bone injury     C 5-6             Past Surgical History:    No*** h/o  upper airway surgery  Past Surgical History:   Procedure Laterality Date    BACK SURGERY      COLONOSCOPY N/A 6/11/2021    Procedure: COLONOSCOPY;  Surgeon: Jay Adame MD;  Location:  GI    ESOPHAGOSCOPY, GASTROSCOPY, DUODENOSCOPY (EGD), COMBINED N/A 6/11/2021    Procedure: ESOPHAGOGASTRODUODENOSCOPY, WITH BIOPSY;  Surgeon: Jay Adame MD;  Location:  GI    GENITOURINARY SURGERY      SOFT TISSUE SURGERY                Physical Examination:   Objective:    Mandibular profile  Reported vitals:  There were no vitals taken for this visit.   GENERAL: alert and no distress  EYES: Eyes grossly normal to inspection.  No discharge or erythema, or obvious scleral/conjunctival abnormalities.  RESP: No audible wheeze, cough, or visible cyanosis.    SKIN: Visible skin clear. No significant rash, abnormal pigmentation or lesions.  NEURO: Cranial nerves grossly  intact.  Mentation and speech appropriate for age.  PSYCH: Appropriate affect, tone, and pace of words        Copy to: [facility] MD 5/13/2025         Total time spent reviewing medical records including previous testing and interpretation as well as direct patient contact and documentation on this date: ***

## 2025-05-22 NOTE — PROGRESS NOTES
"Patient emailed me on Tuesday, 1/14/20, stating his lungs are feeling a little tightness from the collar bone down 3\". He thinks his lungs are getting dried out from the air the AVAPS is pumping through him at night. Wondering if it would be safe to increase humidity level to 4. Informed patient that humidity is a comfort option, so increasing it should be fine. His environment may be playing a role, especially in the winter when the air is more dry, in causing dryness. Will increase humidity level to 4. Settings wont go through until modem calls in again the next day. Changes pended on 1/14/20.  " ambulates with cane/ROM intact/strength 5/5 bilateral upper extremities/strength 5/5 bilateral lower extremities details…

## 2025-06-05 ENCOUNTER — DOCUMENTATION ONLY (OUTPATIENT)
Dept: RESPIRATORY THERAPY | Facility: CLINIC | Age: 65
End: 2025-06-05

## 2025-06-05 NOTE — PROGRESS NOTES
TREATMENT PLAN AND GOALS:  PATIENT INSTRUCTED ON USE AND CARE OF THE PAP EQUIPMENT IN ACCORDANCE WITH THE [facility] PRACTICE GUIDELINES.  MACHINE MODEL AND MODE: IVAPS   PRESSURE SETTING OF: EPAP 6,  PS 8-14,  RATE 10,      PATIENT WAS OFFERED CHOICE OF VENDOR AND CHOSE [facility].  PATIENT TO FOLLOW MEDICARE LCD STANDARDS FOR USE REQUIREMENTS AND INSTRUCTED TO FOLLOW UP WITH THEIR PHYSICIAN WITHIN THESE GUIDELINES.  PATIENT INSTRUCTED TO CALL ONE OF OUR LOCATIONS WITH QUESTIONS AND CONCERNS.  PATIENT WILL BE FOLLOWED UP WITH ACCORDING [facility] PROTOCOL.    RX SIGNED BY: JUAN ARIAS  REFERRAL SOURCE: [facility]  DATE RX SIGNED:  5/13/2025  BENITO = 99    DATE PATIENT WAS SETUP ON: 06/04/2025  LOCATION OF SETUP: PT'S HOME  SETUP BY: ROSA MARIA    MET PT AT HIS HOME FOR REPLACEMENT SETUP. HE HAS USED A DREAMSTATION SO WE WENT OVER EVERYTHING ABOUT THE RESMED. I SHOWED HIM THE DEVICE, START/STOP, POWER CORD, HOW TO CONNECT THE TUBING, WATER CHAMBER (REMINDING HIM TO EMPTY IT DAILY), AND HOW TO CHANGE THE FILTERS. HE MENTIONED THAT SOMEONE TOLD HIM THE SETTINGS MIGHT NOT MATCH WHAT HE HAD BEFORE BECAUSE THE MACHINES ARE DIFFERENT BUT I LET HIM KNOW I WAS BREANA TO SET THE DEVICE TO THE SETTINGS ORDERED. I LET HIM KNOW HE CNA CALL IF SOMETHING FEELS TOO DIFFERENTAND WE CAN MAKE SOME CHANGES REMOTELY, BUT PRESSURE CHANGES NEED TO HAVE A NEW ORDER FROM THE PROVIDER. I FIT HIM FOR THE MASK ANDHE WORE THE DEVICE FOR A FEW MINUTES, STATED IT FEELS THE SAME BUT OF COURSE WHEN HE'S SLEEPING THAT MIGHT CHANGE. PT HAD NO MORE QUESTIONS BUT HAS OUR MAIN LINE AND MY DIRECT LINE IF HE NEEDS ANYTHING.

## 2025-06-12 ENCOUNTER — DOCUMENTATION ONLY (OUTPATIENT)
Dept: SLEEP MEDICINE | Facility: CLINIC | Age: 65
End: 2025-06-12
Payer: MEDICARE

## 2025-06-12 NOTE — PROGRESS NOTES
3 day Sleep therapy management telephone visit    Diagnostic AHI:       Confirmed with patient at time of call- Yes Patient is still interested in STM service       Subjective measures:  Spoke with patient he had some questions on why he received the Aircruve 10 vs the Aircurve 11.  Explained both machine have the same algorithm to him and he requested [facility] to reach out to him. Sent message to [facility].           Objective data     Order Settings for PAP  iVAPS  Patient height  70.0 in  Target patient rate  10 breaths/min  Target alveolar ventilation  2.3 L/min  EPAP  6 cmH2O  Min PS  8 cmH2O  Max PS  14 cmH2O  Trigger  High  Ramp enable  Off  Ramp down enable  Off  Ti min  0.3 sec  Ti max  2 sec  Cycle sensitivity  Low                   Device settings from machine                           Assessment: Nightly usage over four hours      Patient has the following upcoming sleep appts:      Replacement device: Yes  STM ordered by provider: Yes     Total time spent on accessing and  interpreting remote patient PAP therapy data  10 minutes    Total time spent counseling, coaching  and reviewing PAP therapy data with patient  14 minutes    [id number] no

## 2025-06-14 ENCOUNTER — HEALTH MAINTENANCE LETTER (OUTPATIENT)
Age: 65
End: 2025-06-14

## 2025-07-15 ENCOUNTER — ANCILLARY PROCEDURE (OUTPATIENT)
Dept: CT IMAGING | Facility: CLINIC | Age: 65
End: 2025-07-15
Attending: FAMILY MEDICINE
Payer: MEDICARE

## 2025-07-15 DIAGNOSIS — R93.89 ABNORMAL CHEST X-RAY: ICD-10-CM

## 2025-07-15 DIAGNOSIS — R06.09 DYSPNEA ON EXERTION: ICD-10-CM

## 2025-07-15 PROCEDURE — 71260 CT THORAX DX C+: CPT | Mod: GC | Performed by: RADIOLOGY

## 2025-07-15 RX ORDER — IOPAMIDOL 755 MG/ML
71 INJECTION, SOLUTION INTRAVASCULAR ONCE
Status: COMPLETED | OUTPATIENT
Start: 2025-07-15 | End: 2025-07-15

## 2025-07-15 RX ADMIN — IOPAMIDOL 71 ML: 755 INJECTION, SOLUTION INTRAVASCULAR at 13:54

## 2025-07-15 NOTE — DISCHARGE INSTRUCTIONS

## 2025-08-12 ENCOUNTER — TRANSCRIBE ORDERS (OUTPATIENT)
Dept: OTHER | Age: 65
End: 2025-08-12

## 2025-08-12 DIAGNOSIS — R06.09 DYSPNEA ON MINIMAL EXERTION: Primary | ICD-10-CM

## 2025-08-14 DIAGNOSIS — R06.00 DYSPNEA: Primary | ICD-10-CM

## 2025-10-14 ENCOUNTER — PRE VISIT (OUTPATIENT)
Dept: PULMONOLOGY | Facility: CLINIC | Age: 65
End: 2025-10-14

## (undated) RX ORDER — METOPROLOL TARTRATE 50 MG
TABLET ORAL
Status: DISPENSED
Start: 2021-03-04

## (undated) RX ORDER — FENTANYL CITRATE 50 UG/ML
INJECTION, SOLUTION INTRAMUSCULAR; INTRAVENOUS
Status: DISPENSED
Start: 2021-06-11

## (undated) RX ORDER — NITROGLYCERIN 0.4 MG/1
TABLET SUBLINGUAL
Status: DISPENSED
Start: 2021-03-04